# Patient Record
Sex: MALE | Race: WHITE | NOT HISPANIC OR LATINO | Employment: OTHER | URBAN - METROPOLITAN AREA
[De-identification: names, ages, dates, MRNs, and addresses within clinical notes are randomized per-mention and may not be internally consistent; named-entity substitution may affect disease eponyms.]

---

## 2015-12-14 LAB — CRC RECOMMENDATION EXT: NORMAL

## 2017-02-02 ENCOUNTER — HISTORICAL (OUTPATIENT)
Dept: LAB | Facility: HOSPITAL | Age: 47
End: 2017-02-02

## 2017-02-02 LAB
ABS NEUT (OLG): 1.23 X10(3)/MCL (ref 2.1–9.2)
ALBUMIN SERPL-MCNC: 4.3 GM/DL (ref 3.4–5)
ALBUMIN/GLOB SERPL: 1.5 {RATIO}
ALP SERPL-CCNC: 55 UNIT/L (ref 50–136)
ALT SERPL-CCNC: 31 UNIT/L (ref 12–78)
AST SERPL-CCNC: 27 UNIT/L (ref 15–37)
BILIRUB SERPL-MCNC: 0.4 MG/DL (ref 0.2–1)
BILIRUBIN DIRECT+TOT PNL SERPL-MCNC: 0.1 MG/DL (ref 0–0.2)
BILIRUBIN DIRECT+TOT PNL SERPL-MCNC: 0.3 MG/DL (ref 0–0.8)
BUN SERPL-MCNC: 17 MG/DL (ref 7–18)
CALCIUM SERPL-MCNC: 9.2 MG/DL (ref 8.5–10.1)
CHLORIDE SERPL-SCNC: 106 MMOL/L (ref 98–107)
CHOLEST SERPL-MCNC: 109 MG/DL (ref 0–200)
CHOLEST/HDLC SERPL: 1.9 {RATIO} (ref 0–5)
CO2 SERPL-SCNC: 32 MMOL/L (ref 21–32)
CREAT SERPL-MCNC: 1.02 MG/DL (ref 0.7–1.3)
CREAT UR-MCNC: 136 MG/DL
EOSINOPHIL NFR BLD MANUAL: 1 % (ref 0–8)
ERYTHROCYTE [DISTWIDTH] IN BLOOD BY AUTOMATED COUNT: 12.5 % (ref 11.5–17)
EST. AVERAGE GLUCOSE BLD GHB EST-MCNC: 114 MG/DL
GLOBULIN SER-MCNC: 2.8 GM/DL (ref 2.4–3.5)
GLUCOSE SERPL-MCNC: 97 MG/DL (ref 74–106)
HBA1C MFR BLD: 5.6 % (ref 4.2–6.3)
HCT VFR BLD AUTO: 38 % (ref 42–52)
HDLC SERPL-MCNC: 56 MG/DL (ref 35–60)
HGB BLD-MCNC: 12.4 GM/DL (ref 14–18)
LDLC SERPL CALC-MCNC: 37 MG/DL (ref 0–129)
LYMPHOCYTES NFR BLD MANUAL: 38 % (ref 13–40)
LYMPHOCYTES NFR BLD MANUAL: 5 %
MCH RBC QN AUTO: 30.7 PG (ref 27–31)
MCHC RBC AUTO-ENTMCNC: 32.6 GM/DL (ref 33–36)
MCV RBC AUTO: 94.1 FL (ref 80–94)
MICROALBUMIN UR-MCNC: 1.2 MG/DL
MICROALBUMIN/CREAT RATIO PNL UR: 8.8 MG/GM CR (ref 0–30)
MONOCYTES NFR BLD MANUAL: 12 % (ref 2–11)
NEUTROPHILS NFR BLD MANUAL: 44 % (ref 47–80)
PLATELET # BLD AUTO: 184 X10(3)/MCL (ref 130–400)
PLATELET # BLD EST: NORMAL 10*3/UL
PMV BLD AUTO: 10.2 FL (ref 7.4–10.4)
POTASSIUM SERPL-SCNC: 4.5 MMOL/L (ref 3.5–5.1)
PROT SERPL-MCNC: 7.1 GM/DL (ref 6.4–8.2)
RBC # BLD AUTO: 4.04 X10(6)/MCL (ref 4.7–6.1)
SODIUM SERPL-SCNC: 143 MMOL/L (ref 136–145)
TESTOST SERPL-MCNC: 256 NG/DL (ref 241–827)
TRIGL SERPL-MCNC: 81 MG/DL (ref 30–150)
VLDLC SERPL CALC-MCNC: 16 MG/DL
WBC # SPEC AUTO: 2.8 X10(3)/MCL (ref 4.5–11.5)

## 2017-02-14 ENCOUNTER — HISTORICAL (OUTPATIENT)
Dept: LAB | Facility: HOSPITAL | Age: 47
End: 2017-02-14

## 2017-08-02 ENCOUNTER — HISTORICAL (OUTPATIENT)
Dept: LAB | Facility: HOSPITAL | Age: 47
End: 2017-08-02

## 2017-08-02 LAB
ABS NEUT (OLG): 1.58 X10(3)/MCL (ref 2.1–9.2)
BASOPHILS # BLD AUTO: 0 X10(3)/MCL (ref 0–0.2)
BASOPHILS NFR BLD AUTO: 1 %
EOSINOPHIL # BLD AUTO: 0 X10(3)/MCL (ref 0–0.9)
EOSINOPHIL NFR BLD AUTO: 1 %
ERYTHROCYTE [DISTWIDTH] IN BLOOD BY AUTOMATED COUNT: 12.8 % (ref 11.5–17)
HCT VFR BLD AUTO: 41.4 % (ref 42–52)
HGB BLD-MCNC: 13.5 GM/DL (ref 14–18)
LYMPHOCYTES # BLD AUTO: 1.6 X10(3)/MCL (ref 0.6–4.6)
LYMPHOCYTES NFR BLD AUTO: 45 %
MCH RBC QN AUTO: 29.7 PG (ref 27–31)
MCHC RBC AUTO-ENTMCNC: 32.6 GM/DL (ref 33–36)
MCV RBC AUTO: 91 FL (ref 80–94)
MONOCYTES # BLD AUTO: 0.3 X10(3)/MCL (ref 0.1–1.3)
MONOCYTES NFR BLD AUTO: 9 %
NEUTROPHILS # BLD AUTO: 1.58 X10(3)/MCL (ref 2.1–9.2)
NEUTROPHILS NFR BLD AUTO: 44 %
PLATELET # BLD AUTO: 218 X10(3)/MCL (ref 130–400)
PMV BLD AUTO: 9.6 FL (ref 9.4–12.4)
RBC # BLD AUTO: 4.55 X10(6)/MCL (ref 4.7–6.1)
WBC # SPEC AUTO: 3.6 X10(3)/MCL (ref 4.5–11.5)

## 2017-10-02 ENCOUNTER — HISTORICAL (OUTPATIENT)
Dept: RADIOLOGY | Facility: HOSPITAL | Age: 47
End: 2017-10-02

## 2017-11-02 ENCOUNTER — HISTORICAL (OUTPATIENT)
Dept: LAB | Facility: HOSPITAL | Age: 47
End: 2017-11-02

## 2017-11-02 LAB
ABS NEUT (OLG): 1.27 X10(3)/MCL (ref 2.1–9.2)
ACANTHOCYTES (OLG): 0
ALBUMIN SERPL-MCNC: 4.2 GM/DL (ref 3.4–5)
ALBUMIN/GLOB SERPL: 1.4 RATIO (ref 1.1–2)
ALP SERPL-CCNC: 51 UNIT/L (ref 50–136)
ALT SERPL-CCNC: 38 UNIT/L (ref 12–78)
AST SERPL-CCNC: 24 UNIT/L (ref 15–37)
BASOPHILS NFR BLD MANUAL: 2 % (ref 0–2)
BILIRUB SERPL-MCNC: 0.3 MG/DL (ref 0.2–1)
BILIRUBIN DIRECT+TOT PNL SERPL-MCNC: 0.1 MG/DL (ref 0–0.5)
BILIRUBIN DIRECT+TOT PNL SERPL-MCNC: 0.2 MG/DL (ref 0–0.8)
BUN SERPL-MCNC: 14 MG/DL (ref 7–18)
BURR CELLS BLD QL SMEAR: 0
CALCIUM SERPL-MCNC: 9.2 MG/DL (ref 8.5–10.1)
CHLORIDE SERPL-SCNC: 103 MMOL/L (ref 98–107)
CHOLEST SERPL-MCNC: 109 MG/DL (ref 0–200)
CHOLEST/HDLC SERPL: 2.3 {RATIO} (ref 0–5)
CO2 SERPL-SCNC: 29 MMOL/L (ref 21–32)
CREAT SERPL-MCNC: 0.77 MG/DL (ref 0.7–1.3)
EOSINOPHIL NFR BLD MANUAL: 1 % (ref 0–8)
ERYTHROCYTE [DISTWIDTH] IN BLOOD BY AUTOMATED COUNT: 12.6 % (ref 11.5–17)
EST. AVERAGE GLUCOSE BLD GHB EST-MCNC: 120 MG/DL
GLOBULIN SER-MCNC: 2.9 GM/DL (ref 2.4–3.5)
GLUCOSE SERPL-MCNC: 99 MG/DL (ref 74–106)
HBA1C MFR BLD: 5.8 % (ref 4.2–6.3)
HCT VFR BLD AUTO: 37.8 % (ref 42–52)
HDLC SERPL-MCNC: 48 MG/DL (ref 35–60)
HGB BLD-MCNC: 12.4 GM/DL (ref 14–18)
LDLC SERPL CALC-MCNC: 25 MG/DL (ref 0–129)
LYMPHOCYTES NFR BLD MANUAL: 16 %
LYMPHOCYTES NFR BLD MANUAL: 36 % (ref 13–40)
MCH RBC QN AUTO: 30 PG (ref 27–31)
MCHC RBC AUTO-ENTMCNC: 32.8 GM/DL (ref 33–36)
MCV RBC AUTO: 91.3 FL (ref 80–94)
MONOCYTES NFR BLD MANUAL: 6 % (ref 2–11)
NEUTROPHILS NFR BLD MANUAL: 39 % (ref 47–80)
OVALOCYTES BLD QL SMEAR: 0
PLATELET # BLD AUTO: 209 X10(3)/MCL (ref 130–400)
PLATELET # BLD EST: NORMAL 10*3/UL
PMV BLD AUTO: 9.7 FL (ref 7.4–10.4)
POTASSIUM SERPL-SCNC: 3.8 MMOL/L (ref 3.5–5.1)
PROT SERPL-MCNC: 7.1 GM/DL (ref 6.4–8.2)
RBC # BLD AUTO: 4.14 X10(6)/MCL (ref 4.7–6.1)
SODIUM SERPL-SCNC: 139 MMOL/L (ref 136–145)
TRIGL SERPL-MCNC: 182 MG/DL (ref 30–150)
VLDLC SERPL CALC-MCNC: 36 MG/DL
WBC # SPEC AUTO: 3.3 X10(3)/MCL (ref 4.5–11.5)

## 2017-12-07 ENCOUNTER — HISTORICAL (OUTPATIENT)
Dept: ADMINISTRATIVE | Facility: HOSPITAL | Age: 47
End: 2017-12-07

## 2017-12-07 LAB
ABS NEUT (OLG): 1.54 X10(3)/MCL (ref 2.1–9.2)
ALBUMIN SERPL-MCNC: 4.1 GM/DL (ref 3.4–5)
ALBUMIN/GLOB SERPL: 1.4 {RATIO}
ALP SERPL-CCNC: 56 UNIT/L (ref 50–136)
ALT SERPL-CCNC: 38 UNIT/L (ref 12–78)
ANISOCYTOSIS BLD QL SMEAR: 1
AST SERPL-CCNC: 21 UNIT/L (ref 15–37)
BASOPHILS # BLD AUTO: 0 X10(3)/MCL (ref 0–0.2)
BASOPHILS NFR BLD AUTO: 0.3 %
BASOPHILS NFR BLD MANUAL: 1 % (ref 0–2)
BILIRUB SERPL-MCNC: 0.2 MG/DL (ref 0.2–1)
BILIRUBIN DIRECT+TOT PNL SERPL-MCNC: 0.1 MG/DL (ref 0–0.2)
BILIRUBIN DIRECT+TOT PNL SERPL-MCNC: 0.1 MG/DL (ref 0–0.8)
BUN SERPL-MCNC: 21 MG/DL (ref 7–18)
BURR CELLS BLD QL SMEAR: 1
CALCIUM SERPL-MCNC: 8.9 MG/DL (ref 8.5–10.1)
CHLORIDE SERPL-SCNC: 104 MMOL/L (ref 98–107)
CO2 SERPL-SCNC: 29 MMOL/L (ref 21–32)
CREAT SERPL-MCNC: 0.82 MG/DL (ref 0.7–1.3)
EOSINOPHIL # BLD AUTO: 0 X10(3)/MCL (ref 0–0.9)
EOSINOPHIL NFR BLD AUTO: 1.3 %
EOSINOPHIL NFR BLD MANUAL: 2 % (ref 0–8)
ERYTHROCYTE [DISTWIDTH] IN BLOOD BY AUTOMATED COUNT: 12.7 % (ref 11.5–17)
ERYTHROCYTE [SEDIMENTATION RATE] IN BLOOD: 8 MM/HR (ref 0–15)
FERRITIN SERPL-MCNC: 60.5 NG/ML (ref 8–388)
FOLATE SERPL-MCNC: 30.9 NG/ML (ref 3.1–17.5)
GLOBULIN SER-MCNC: 3 GM/DL (ref 2.4–3.5)
GLUCOSE SERPL-MCNC: 87 MG/DL (ref 74–106)
HAPTOGLOB SERPL-MCNC: 91 MG/DL (ref 31–200)
HAV IGM SERPL QL IA: NEGATIVE
HBV CORE IGM SERPL QL IA: NEGATIVE
HBV SURFACE AG SERPL QL IA: NEGATIVE
HCT VFR BLD AUTO: 37 % (ref 42–52)
HCV AB SERPL QL IA: NEGATIVE
HEPATITIS PANEL INTERP: NORMAL
HGB BLD-MCNC: 12.5 GM/DL (ref 14–18)
HIV 1+2 AB+HIV1 P24 AG SERPL QL IA: NEGATIVE
IRON SATN MFR SERPL: 26.6 % (ref 20–50)
IRON SERPL-MCNC: 106 MCG/DL (ref 50–175)
LDH SERPL-CCNC: 167 UNIT/L (ref 87–241)
LYMPHOCYTES # BLD AUTO: 1.8 X10(3)/MCL (ref 0.6–4.6)
LYMPHOCYTES NFR BLD AUTO: 47.5 %
LYMPHOCYTES NFR BLD MANUAL: 31 % (ref 13–40)
LYMPHOCYTES NFR BLD MANUAL: 9 %
MCH RBC QN AUTO: 30.9 PG (ref 27–31)
MCHC RBC AUTO-ENTMCNC: 33.8 GM/DL (ref 33–36)
MCV RBC AUTO: 91.6 FL (ref 80–94)
MICROCYTES BLD QL SMEAR: 1
MONOCYTES # BLD AUTO: 0.4 X10(3)/MCL (ref 0.1–1.3)
MONOCYTES NFR BLD AUTO: 9.9 %
MONOCYTES NFR BLD MANUAL: 11 % (ref 2–11)
NEUTROPHILS # BLD AUTO: 1.5 X10(3)/MCL (ref 2.1–9.2)
NEUTROPHILS # BLD AUTO: 1.51 X10(3)/MCL (ref 2.1–9.2)
NEUTROPHILS NFR BLD AUTO: 41 %
NEUTROPHILS NFR BLD MANUAL: 46 % (ref 47–80)
PLATELET # BLD AUTO: 201 X10(3)/MCL (ref 130–400)
PLATELET # BLD EST: NORMAL 10*3/UL
PMV BLD AUTO: 8.7 FL (ref 9.4–12.4)
POTASSIUM SERPL-SCNC: 3.5 MMOL/L (ref 3.5–5.1)
PROT SERPL-MCNC: 6.9 GM/DL
PROT SERPL-MCNC: 7.1 GM/DL (ref 6.4–8.2)
RBC # BLD AUTO: 4.04 X10(6)/MCL (ref 4.7–6.1)
RET# (OHS): 0.08 X10^6/ML (ref 0.03–0.1)
RETICULOCYTE COUNT AUTOMATED (OLG): 2 % (ref 1.1–2.1)
RHEUMATOID FACT SERPL-ACNC: 10 IU/ML (ref 0–15)
SODIUM SERPL-SCNC: 139 MMOL/L (ref 136–145)
TIBC SERPL-MCNC: 399 MCG/DL (ref 250–450)
TRANSFERRIN SERPL-MCNC: 307 MG/DL (ref 200–360)
TSH SERPL-ACNC: 1.21 MIU/ML (ref 0.36–3.74)
VIT B12 SERPL-MCNC: 1600 PG/ML (ref 193–986)
WBC # SPEC AUTO: 3.8 X10(3)/MCL (ref 4.5–11.5)

## 2017-12-15 ENCOUNTER — HISTORICAL (OUTPATIENT)
Dept: RADIOLOGY | Facility: HOSPITAL | Age: 47
End: 2017-12-15

## 2017-12-22 ENCOUNTER — HISTORICAL (OUTPATIENT)
Dept: ADMINISTRATIVE | Facility: HOSPITAL | Age: 47
End: 2017-12-22

## 2017-12-22 LAB
ABS NEUT (OLG): 1.45 X10(3)/MCL (ref 2.1–9.2)
BASOPHILS NFR BLD MANUAL: 2 % (ref 0–2)
EOSINOPHIL NFR BLD MANUAL: 5 % (ref 0–8)
ERYTHROCYTE [DISTWIDTH] IN BLOOD BY AUTOMATED COUNT: 12.9 % (ref 11.5–17)
HCT VFR BLD AUTO: 34.8 % (ref 42–52)
HGB BLD-MCNC: 11.8 GM/DL (ref 14–18)
LYMPHOCYTES NFR BLD MANUAL: 34 % (ref 13–40)
LYMPHOCYTES NFR BLD MANUAL: 6 %
MCH RBC QN AUTO: 30.6 PG (ref 27–31)
MCHC RBC AUTO-ENTMCNC: 33.9 GM/DL (ref 33–36)
MCV RBC AUTO: 90.2 FL (ref 80–94)
MONOCYTES NFR BLD MANUAL: 12 % (ref 2–11)
NEUTROPHILS NFR BLD MANUAL: 42 % (ref 47–80)
PLATELET # BLD AUTO: 204 X10(3)/MCL (ref 130–400)
PLATELET # BLD EST: NORMAL 10*3/UL
PMV BLD AUTO: 9 FL (ref 7.4–10.4)
RBC # BLD AUTO: 3.86 X10(6)/MCL (ref 4.7–6.1)
TARGETS BLD QL SMEAR: 1
WBC # SPEC AUTO: 3.2 X10(3)/MCL (ref 4.5–11.5)

## 2017-12-27 ENCOUNTER — HISTORICAL (OUTPATIENT)
Dept: RADIOLOGY | Facility: HOSPITAL | Age: 47
End: 2017-12-27

## 2018-01-09 ENCOUNTER — HISTORICAL (OUTPATIENT)
Dept: RADIOLOGY | Facility: HOSPITAL | Age: 48
End: 2018-01-09

## 2018-02-20 ENCOUNTER — HISTORICAL (OUTPATIENT)
Dept: RADIOLOGY | Facility: HOSPITAL | Age: 48
End: 2018-02-20

## 2018-02-28 ENCOUNTER — HISTORICAL (OUTPATIENT)
Dept: LAB | Facility: HOSPITAL | Age: 48
End: 2018-02-28

## 2018-02-28 LAB
CHOLEST SERPL-MCNC: 124 MG/DL (ref 0–200)
CHOLEST/HDLC SERPL: 2.3 {RATIO} (ref 0–5)
CREAT UR-MCNC: 95.8 MG/DL
EST. AVERAGE GLUCOSE BLD GHB EST-MCNC: 123 MG/DL
HBA1C MFR BLD: 5.9 % (ref 4.2–6.3)
HDLC SERPL-MCNC: 54 MG/DL (ref 35–60)
LDLC SERPL CALC-MCNC: 52 MG/DL (ref 0–129)
MICROALBUMIN UR-MCNC: 0.8 MG/DL
MICROALBUMIN/CREAT RATIO PNL UR: 7.9 MG/GM CR (ref 0–30)
TRIGL SERPL-MCNC: 91 MG/DL (ref 30–150)
VLDLC SERPL CALC-MCNC: 18 MG/DL

## 2018-03-06 ENCOUNTER — HISTORICAL (OUTPATIENT)
Dept: INTENSIVE CARE | Facility: HOSPITAL | Age: 48
End: 2018-03-06

## 2018-04-12 ENCOUNTER — HISTORICAL (OUTPATIENT)
Dept: ADMINISTRATIVE | Facility: HOSPITAL | Age: 48
End: 2018-04-12

## 2018-04-12 LAB
ABS NEUT (OLG): 1.52 X10(3)/MCL (ref 2.1–9.2)
APTT PPP: 30.9 SECOND(S) (ref 24.8–36.9)
BASOPHILS # BLD AUTO: 0 X10(3)/MCL (ref 0–0.2)
BASOPHILS NFR BLD AUTO: 1 %
BUN SERPL-MCNC: 18 MG/DL (ref 7–18)
CALCIUM SERPL-MCNC: 9.2 MG/DL (ref 8.5–10.1)
CHLORIDE SERPL-SCNC: 106 MMOL/L (ref 98–107)
CO2 SERPL-SCNC: 32 MMOL/L (ref 21–32)
CREAT SERPL-MCNC: 0.87 MG/DL (ref 0.7–1.3)
CREAT/UREA NIT SERPL: 20.7
EOSINOPHIL # BLD AUTO: 0.1 X10(3)/MCL (ref 0–0.9)
EOSINOPHIL NFR BLD AUTO: 2 %
ERYTHROCYTE [DISTWIDTH] IN BLOOD BY AUTOMATED COUNT: 12.8 % (ref 11.5–17)
GLUCOSE SERPL-MCNC: 94 MG/DL (ref 74–106)
HCT VFR BLD AUTO: 39.5 % (ref 42–52)
HGB BLD-MCNC: 12.8 GM/DL (ref 14–18)
INR PPP: 0.9 (ref 0–1.27)
LYMPHOCYTES # BLD AUTO: 1.8 X10(3)/MCL (ref 0.6–4.6)
LYMPHOCYTES NFR BLD AUTO: 46 %
MCH RBC QN AUTO: 31 PG (ref 27–31)
MCHC RBC AUTO-ENTMCNC: 32.4 GM/DL (ref 33–36)
MCV RBC AUTO: 95.6 FL (ref 80–94)
MONOCYTES # BLD AUTO: 0.4 X10(3)/MCL (ref 0.1–1.3)
MONOCYTES NFR BLD AUTO: 11 %
NEUTROPHILS # BLD AUTO: 1.52 X10(3)/MCL (ref 1.4–7.9)
NEUTROPHILS NFR BLD AUTO: 40 %
PLATELET # BLD AUTO: 186 X10(3)/MCL (ref 130–400)
PMV BLD AUTO: 9.2 FL (ref 9.4–12.4)
POTASSIUM SERPL-SCNC: 4.4 MMOL/L (ref 3.5–5.1)
PROTHROMBIN TIME: 12.4 SECOND(S) (ref 12.2–14.7)
RBC # BLD AUTO: 4.13 X10(6)/MCL (ref 4.7–6.1)
SODIUM SERPL-SCNC: 141 MMOL/L (ref 136–145)
WBC # SPEC AUTO: 3.8 X10(3)/MCL (ref 4.5–11.5)

## 2018-06-05 ENCOUNTER — HISTORICAL (OUTPATIENT)
Dept: RADIOLOGY | Facility: HOSPITAL | Age: 48
End: 2018-06-05

## 2018-06-06 LAB
ABS NEUT (OLG): 1.8 X10(3)/MCL (ref 2.1–9.2)
ALBUMIN SERPL-MCNC: 3.9 GM/DL (ref 3.4–5)
ALBUMIN/GLOB SERPL: 1.5 {RATIO}
ALP SERPL-CCNC: 44 UNIT/L (ref 50–136)
ALT SERPL-CCNC: 71 UNIT/L (ref 12–78)
APTT PPP: 33 SECOND(S) (ref 24.8–36.9)
AST SERPL-CCNC: 60 UNIT/L (ref 15–37)
BASOPHILS NFR BLD MANUAL: 2 % (ref 0–2)
BILIRUB SERPL-MCNC: 0.5 MG/DL (ref 0.2–1)
BILIRUBIN DIRECT+TOT PNL SERPL-MCNC: 0.1 MG/DL (ref 0–0.2)
BILIRUBIN DIRECT+TOT PNL SERPL-MCNC: 0.4 MG/DL (ref 0–0.8)
BUN SERPL-MCNC: 22 MG/DL (ref 7–18)
CALCIUM SERPL-MCNC: 8.8 MG/DL (ref 8.5–10.1)
CHLORIDE SERPL-SCNC: 108 MMOL/L (ref 98–107)
CO2 SERPL-SCNC: 27 MMOL/L (ref 21–32)
CREAT SERPL-MCNC: 0.96 MG/DL (ref 0.7–1.3)
EOSINOPHIL NFR BLD MANUAL: 1 % (ref 0–8)
ERYTHROCYTE [DISTWIDTH] IN BLOOD BY AUTOMATED COUNT: 12.9 % (ref 11.5–17)
GLOBULIN SER-MCNC: 2.6 GM/DL (ref 2.4–3.5)
GLUCOSE SERPL-MCNC: 99 MG/DL (ref 74–106)
HCT VFR BLD AUTO: 37.2 % (ref 42–52)
HGB BLD-MCNC: 12.4 GM/DL (ref 14–18)
INR PPP: 0.92 (ref 0–1.27)
LYMPHOCYTES NFR BLD MANUAL: 33 % (ref 13–40)
MCH RBC QN AUTO: 31.7 PG (ref 27–31)
MCHC RBC AUTO-ENTMCNC: 33.3 GM/DL (ref 33–36)
MCV RBC AUTO: 95.1 FL (ref 80–94)
MONOCYTES NFR BLD MANUAL: 5 % (ref 2–11)
NEUTROPHILS NFR BLD MANUAL: 59 % (ref 47–80)
PLATELET # BLD AUTO: 187 X10(3)/MCL (ref 130–400)
PLATELET # BLD EST: NORMAL 10*3/UL
PMV BLD AUTO: 9.3 FL (ref 7.4–10.4)
POTASSIUM SERPL-SCNC: 3.9 MMOL/L (ref 3.5–5.1)
PROT SERPL-MCNC: 6.5 GM/DL (ref 6.4–8.2)
PROTHROMBIN TIME: 12.6 SECOND(S) (ref 12.2–14.7)
RBC # BLD AUTO: 3.91 X10(6)/MCL (ref 4.7–6.1)
SODIUM SERPL-SCNC: 143 MMOL/L (ref 136–145)
WBC # SPEC AUTO: 3.4 X10(3)/MCL (ref 4.5–11.5)

## 2018-06-14 ENCOUNTER — HISTORICAL (OUTPATIENT)
Dept: ONCOLOGY | Facility: HOSPITAL | Age: 48
End: 2018-06-14

## 2018-07-27 ENCOUNTER — HISTORICAL (OUTPATIENT)
Dept: HEMATOLOGY/ONCOLOGY | Facility: CLINIC | Age: 48
End: 2018-07-27

## 2018-07-27 LAB
ABS NEUT (OLG): 1.72 X10(3)/MCL (ref 2.1–9.2)
BASOPHILS # BLD AUTO: 0 X10(3)/MCL (ref 0–0.2)
BASOPHILS NFR BLD AUTO: 0.3 %
EOSINOPHIL # BLD AUTO: 0 X10(3)/MCL (ref 0–0.9)
EOSINOPHIL NFR BLD AUTO: 1.1 %
ERYTHROCYTE [DISTWIDTH] IN BLOOD BY AUTOMATED COUNT: 12.7 % (ref 11.5–17)
ERYTHROCYTE [SEDIMENTATION RATE] IN BLOOD: 11 MM/HR (ref 0–15)
FERRITIN SERPL-MCNC: 60.3 NG/ML (ref 8–388)
FOLATE SERPL-MCNC: 98.9 NG/ML (ref 3.1–17.5)
HCT VFR BLD AUTO: 37.1 % (ref 42–52)
HGB BLD-MCNC: 12.2 GM/DL (ref 14–18)
IRON SATN MFR SERPL: 24.4 % (ref 20–50)
IRON SERPL-MCNC: 104 MCG/DL (ref 50–175)
LYMPHOCYTES # BLD AUTO: 1.5 X10(3)/MCL (ref 0.6–4.6)
LYMPHOCYTES NFR BLD AUTO: 41.9 %
MCH RBC QN AUTO: 30.8 PG (ref 27–31)
MCHC RBC AUTO-ENTMCNC: 32.9 GM/DL (ref 33–36)
MCV RBC AUTO: 93.7 FL (ref 80–94)
MONOCYTES # BLD AUTO: 0.3 X10(3)/MCL (ref 0.1–1.3)
MONOCYTES NFR BLD AUTO: 9.4 %
NEUTROPHILS # BLD AUTO: 1.7 X10(3)/MCL (ref 2.1–9.2)
NEUTROPHILS NFR BLD AUTO: 47.3 %
PLATELET # BLD AUTO: 219 X10(3)/MCL (ref 130–400)
PMV BLD AUTO: 8.6 FL (ref 9.4–12.4)
RBC # BLD AUTO: 3.96 X10(6)/MCL (ref 4.7–6.1)
RET# (OHS): 0.07 X10^6/ML (ref 0.03–0.1)
RETICULOCYTE COUNT AUTOMATED (OLG): 1.9 % (ref 1.1–2.1)
TIBC SERPL-MCNC: 426 MCG/DL (ref 250–450)
TRANSFERRIN SERPL-MCNC: 351 MG/DL (ref 200–360)
VIT B12 SERPL-MCNC: 1792 PG/ML (ref 193–986)
WBC # SPEC AUTO: 3.6 X10(3)/MCL (ref 4.5–11.5)

## 2018-11-16 ENCOUNTER — HISTORICAL (OUTPATIENT)
Dept: LAB | Facility: HOSPITAL | Age: 48
End: 2018-11-16

## 2018-11-16 LAB
ABS NEUT (OLG): 3.16 X10(3)/MCL (ref 2.1–9.2)
ALBUMIN SERPL-MCNC: 4.5 GM/DL (ref 3.4–5)
ALBUMIN/GLOB SERPL: 1.5 RATIO (ref 1.1–2)
ALP SERPL-CCNC: 53 UNIT/L (ref 50–136)
ALT SERPL-CCNC: 57 UNIT/L (ref 12–78)
APPEARANCE, UA: CLEAR
AST SERPL-CCNC: 47 UNIT/L (ref 15–37)
BACTERIA SPEC CULT: ABNORMAL /HPF
BASOPHILS # BLD AUTO: 0 X10(3)/MCL (ref 0–0.2)
BASOPHILS NFR BLD AUTO: 0 %
BILIRUB SERPL-MCNC: 0.4 MG/DL (ref 0.2–1)
BILIRUB UR QL STRIP: ABNORMAL
BILIRUBIN DIRECT+TOT PNL SERPL-MCNC: 0.2 MG/DL (ref 0–0.5)
BILIRUBIN DIRECT+TOT PNL SERPL-MCNC: 0.2 MG/DL (ref 0–0.8)
BUN SERPL-MCNC: 16 MG/DL (ref 7–18)
CALCIUM SERPL-MCNC: 9.7 MG/DL (ref 8.5–10.1)
CHLORIDE SERPL-SCNC: 103 MMOL/L (ref 98–107)
CHOLEST SERPL-MCNC: 120 MG/DL (ref 0–200)
CHOLEST/HDLC SERPL: 1.8 {RATIO} (ref 0–5)
CO2 SERPL-SCNC: 33 MMOL/L (ref 21–32)
COLOR UR: ABNORMAL
CREAT SERPL-MCNC: 0.89 MG/DL (ref 0.7–1.3)
EOSINOPHIL # BLD AUTO: 0.1 X10(3)/MCL (ref 0–0.9)
EOSINOPHIL NFR BLD AUTO: 2 %
ERYTHROCYTE [DISTWIDTH] IN BLOOD BY AUTOMATED COUNT: 12.8 % (ref 11.5–17)
EST. AVERAGE GLUCOSE BLD GHB EST-MCNC: 123 MG/DL
GLOBULIN SER-MCNC: 3 GM/DL (ref 2.4–3.5)
GLUCOSE (UA): NEGATIVE
GLUCOSE SERPL-MCNC: 102 MG/DL (ref 74–106)
HBA1C MFR BLD: 5.9 % (ref 4.2–6.3)
HCT VFR BLD AUTO: 41.6 % (ref 42–52)
HDLC SERPL-MCNC: 67 MG/DL (ref 35–60)
HGB BLD-MCNC: 13.3 GM/DL (ref 14–18)
HGB UR QL STRIP: NEGATIVE
KETONES UR QL STRIP: ABNORMAL
LDLC SERPL CALC-MCNC: 43 MG/DL (ref 0–129)
LEUKOCYTE ESTERASE UR QL STRIP: ABNORMAL
LYMPHOCYTES # BLD AUTO: 1 X10(3)/MCL (ref 0.6–4.6)
LYMPHOCYTES NFR BLD AUTO: 22 %
MCH RBC QN AUTO: 31 PG (ref 27–31)
MCHC RBC AUTO-ENTMCNC: 32 GM/DL (ref 33–36)
MCV RBC AUTO: 97 FL (ref 80–94)
MONOCYTES # BLD AUTO: 0.4 X10(3)/MCL (ref 0.1–1.3)
MONOCYTES NFR BLD AUTO: 9 %
NEUTROPHILS # BLD AUTO: 3.16 X10(3)/MCL (ref 2.1–9.2)
NEUTROPHILS NFR BLD AUTO: 66 %
NITRITE UR QL STRIP: NEGATIVE
PH UR STRIP: 6 [PH] (ref 5–9)
PLATELET # BLD AUTO: 207 X10(3)/MCL (ref 130–400)
PMV BLD AUTO: 9.8 FL (ref 9.4–12.4)
POTASSIUM SERPL-SCNC: 3.7 MMOL/L (ref 3.5–5.1)
PROT SERPL-MCNC: 7.5 GM/DL (ref 6.4–8.2)
PROT UR QL STRIP: NEGATIVE
RBC # BLD AUTO: 4.29 X10(6)/MCL (ref 4.7–6.1)
RBC #/AREA URNS HPF: ABNORMAL /[HPF]
SODIUM SERPL-SCNC: 143 MMOL/L (ref 136–145)
SP GR UR STRIP: 1.02 (ref 1–1.03)
SQUAMOUS EPITHELIAL, UA: 8 /HPF (ref 0–4)
TRIGL SERPL-MCNC: 49 MG/DL (ref 30–150)
TSH SERPL-ACNC: 0.8 MIU/L (ref 0.36–3.74)
UROBILINOGEN UR STRIP-ACNC: 1
VLDLC SERPL CALC-MCNC: 10 MG/DL
WBC # SPEC AUTO: 4.8 X10(3)/MCL (ref 4.5–11.5)
WBC #/AREA URNS HPF: 9 /HPF (ref 0–3)

## 2019-01-22 ENCOUNTER — HISTORICAL (OUTPATIENT)
Dept: HEMATOLOGY/ONCOLOGY | Facility: CLINIC | Age: 49
End: 2019-01-22

## 2019-01-22 LAB
ABS NEUT (OLG): 1.94 X10(3)/MCL (ref 2.1–9.2)
BASOPHILS # BLD AUTO: 0 X10(3)/MCL (ref 0–0.2)
BASOPHILS NFR BLD AUTO: 0.5 %
EOSINOPHIL # BLD AUTO: 0 X10(3)/MCL (ref 0–0.9)
EOSINOPHIL NFR BLD AUTO: 0.8 %
ERYTHROCYTE [DISTWIDTH] IN BLOOD BY AUTOMATED COUNT: 12.3 % (ref 11.5–17)
FERRITIN SERPL-MCNC: 102.3 NG/ML (ref 8–388)
FOLATE SERPL-MCNC: 35.6 NG/ML (ref 3.1–17.5)
HCT VFR BLD AUTO: 41.9 % (ref 42–52)
HGB BLD-MCNC: 13.6 GM/DL (ref 14–18)
IRON SATN MFR SERPL: 20.5 % (ref 20–50)
IRON SERPL-MCNC: 90 MCG/DL (ref 50–175)
LYMPHOCYTES # BLD AUTO: 1.5 X10(3)/MCL (ref 0.6–4.6)
LYMPHOCYTES NFR BLD AUTO: 40.1 %
MCH RBC QN AUTO: 29.9 PG (ref 27–31)
MCHC RBC AUTO-ENTMCNC: 32.5 GM/DL (ref 33–36)
MCV RBC AUTO: 92.1 FL (ref 80–94)
MONOCYTES # BLD AUTO: 0.2 X10(3)/MCL (ref 0.1–1.3)
MONOCYTES NFR BLD AUTO: 6.4 %
NEUTROPHILS # BLD AUTO: 1.9 X10(3)/MCL (ref 2.1–9.2)
NEUTROPHILS NFR BLD AUTO: 51.9 %
PLATELET # BLD AUTO: 241 X10(3)/MCL (ref 130–400)
PMV BLD AUTO: 8.7 FL (ref 9.4–12.4)
RBC # BLD AUTO: 4.55 X10(6)/MCL (ref 4.7–6.1)
TIBC SERPL-MCNC: 438 MCG/DL (ref 250–450)
TRANSFERRIN SERPL-MCNC: 318 MG/DL (ref 200–360)
VIT B12 SERPL-MCNC: 1731 PG/ML (ref 193–986)
WBC # SPEC AUTO: 3.7 X10(3)/MCL (ref 4.5–11.5)

## 2019-05-10 ENCOUNTER — HISTORICAL (OUTPATIENT)
Dept: LAB | Facility: HOSPITAL | Age: 49
End: 2019-05-10

## 2019-05-10 LAB
ABS NEUT (OLG): 1.57 X10(3)/MCL (ref 2.1–9.2)
ALBUMIN SERPL-MCNC: 4.1 GM/DL (ref 3.4–5)
ALBUMIN/GLOB SERPL: 1.4 {RATIO}
ALP SERPL-CCNC: 51 UNIT/L (ref 50–136)
ALT SERPL-CCNC: 83 UNIT/L (ref 12–78)
AST SERPL-CCNC: 59 UNIT/L (ref 15–37)
BASOPHILS # BLD AUTO: 0 X10(3)/MCL (ref 0–0.2)
BASOPHILS NFR BLD AUTO: 1 %
BILIRUB SERPL-MCNC: 0.3 MG/DL (ref 0.2–1)
BILIRUBIN DIRECT+TOT PNL SERPL-MCNC: 0.1 MG/DL (ref 0–0.2)
BILIRUBIN DIRECT+TOT PNL SERPL-MCNC: 0.2 MG/DL (ref 0–0.8)
BUN SERPL-MCNC: 18 MG/DL (ref 7–18)
CALCIUM SERPL-MCNC: 8.9 MG/DL (ref 8.5–10.1)
CHLORIDE SERPL-SCNC: 107 MMOL/L (ref 98–107)
CHOLEST SERPL-MCNC: 113 MG/DL (ref 0–200)
CHOLEST/HDLC SERPL: 1.8 {RATIO} (ref 0–5)
CO2 SERPL-SCNC: 31 MMOL/L (ref 21–32)
CREAT SERPL-MCNC: 0.78 MG/DL (ref 0.7–1.3)
CREAT UR-MCNC: 289 MG/DL
EOSINOPHIL # BLD AUTO: 0.1 X10(3)/MCL (ref 0–0.9)
EOSINOPHIL NFR BLD AUTO: 2 %
ERYTHROCYTE [DISTWIDTH] IN BLOOD BY AUTOMATED COUNT: 13.1 % (ref 11.5–17)
EST. AVERAGE GLUCOSE BLD GHB EST-MCNC: 128 MG/DL
GLOBULIN SER-MCNC: 2.9 GM/DL (ref 2.4–3.5)
GLUCOSE SERPL-MCNC: 101 MG/DL (ref 74–106)
HBA1C MFR BLD: 6.1 % (ref 4.2–6.3)
HCT VFR BLD AUTO: 42.6 % (ref 42–52)
HDLC SERPL-MCNC: 62 MG/DL (ref 35–60)
HGB BLD-MCNC: 13.5 GM/DL (ref 14–18)
LDLC SERPL CALC-MCNC: 39 MG/DL (ref 0–129)
LYMPHOCYTES # BLD AUTO: 1.4 X10(3)/MCL (ref 0.6–4.6)
LYMPHOCYTES NFR BLD AUTO: 40 %
MCH RBC QN AUTO: 30.2 PG (ref 27–31)
MCHC RBC AUTO-ENTMCNC: 31.7 GM/DL (ref 33–36)
MCV RBC AUTO: 95.3 FL (ref 80–94)
MICROALBUMIN UR-MCNC: 2.8 MG/DL
MICROALBUMIN/CREAT RATIO PNL UR: 9.7 MG/GM CR (ref 0–30)
MONOCYTES # BLD AUTO: 0.4 X10(3)/MCL (ref 0.1–1.3)
MONOCYTES NFR BLD AUTO: 11 %
NEUTROPHILS # BLD AUTO: 1.57 X10(3)/MCL (ref 2.1–9.2)
NEUTROPHILS NFR BLD AUTO: 46 %
PLATELET # BLD AUTO: 235 X10(3)/MCL (ref 130–400)
PMV BLD AUTO: 9.8 FL (ref 9.4–12.4)
POTASSIUM SERPL-SCNC: 4 MMOL/L (ref 3.5–5.1)
PROT SERPL-MCNC: 7 GM/DL (ref 6.4–8.2)
PSA SERPL-MCNC: 1.01 NG/ML (ref 0–4)
RBC # BLD AUTO: 4.47 X10(6)/MCL (ref 4.7–6.1)
SODIUM SERPL-SCNC: 141 MMOL/L (ref 136–145)
TRIGL SERPL-MCNC: 59 MG/DL (ref 30–150)
TSH SERPL-ACNC: 0.76 MIU/L (ref 0.36–3.74)
VLDLC SERPL CALC-MCNC: 12 MG/DL
WBC # SPEC AUTO: 3.4 X10(3)/MCL (ref 4.5–11.5)

## 2019-06-03 ENCOUNTER — HISTORICAL (OUTPATIENT)
Dept: ADMINISTRATIVE | Facility: HOSPITAL | Age: 49
End: 2019-06-03

## 2019-06-03 LAB
BUN SERPL-MCNC: 21 MG/DL (ref 7–18)
CALCIUM SERPL-MCNC: 9.5 MG/DL (ref 8.5–10.1)
CHLORIDE SERPL-SCNC: 103 MMOL/L (ref 98–107)
CO2 SERPL-SCNC: 29 MMOL/L (ref 21–32)
CREAT SERPL-MCNC: 0.89 MG/DL (ref 0.7–1.3)
CREAT/UREA NIT SERPL: 24
GLUCOSE SERPL-MCNC: 123 MG/DL (ref 74–106)
POTASSIUM SERPL-SCNC: 3.7 MMOL/L (ref 3.5–5.1)
SODIUM SERPL-SCNC: 137 MMOL/L (ref 136–145)

## 2019-06-05 ENCOUNTER — HISTORICAL (OUTPATIENT)
Dept: RADIOLOGY | Facility: HOSPITAL | Age: 49
End: 2019-06-05

## 2019-06-13 ENCOUNTER — HISTORICAL (OUTPATIENT)
Dept: SURGERY | Facility: HOSPITAL | Age: 49
End: 2019-06-13

## 2019-07-29 LAB
BILIRUB SERPL-MCNC: NEGATIVE MG/DL
BLOOD URINE, POC: NEGATIVE
CLARITY, POC UA: CLEAR
COLOR, POC UA: YELLOW
GLUCOSE UR QL STRIP: NEGATIVE
KETONES UR QL STRIP: NEGATIVE
LEUKOCYTE EST, POC UA: NEGATIVE
NITRITE, POC UA: NEGATIVE
PH, POC UA: 6
PROTEIN, POC: NEGATIVE
SPECIFIC GRAVITY, POC UA: 1.02

## 2019-09-12 ENCOUNTER — HISTORICAL (OUTPATIENT)
Dept: RADIOLOGY | Facility: HOSPITAL | Age: 49
End: 2019-09-12

## 2019-09-12 LAB — POC CREATININE: 0.8 MG/DL (ref 0.6–1.3)

## 2019-11-14 ENCOUNTER — HISTORICAL (OUTPATIENT)
Dept: LAB | Facility: HOSPITAL | Age: 49
End: 2019-11-14

## 2019-11-14 LAB
ALBUMIN SERPL-MCNC: 4.7 GM/DL (ref 3.4–5)
ALBUMIN/GLOB SERPL: 1.6 RATIO (ref 1.1–2)
ALP SERPL-CCNC: 62 UNIT/L (ref 50–136)
ALT SERPL-CCNC: 47 UNIT/L (ref 12–78)
AST SERPL-CCNC: 24 UNIT/L (ref 15–37)
BILIRUB SERPL-MCNC: 0.3 MG/DL (ref 0.2–1)
BILIRUBIN DIRECT+TOT PNL SERPL-MCNC: 0.1 MG/DL (ref 0–0.5)
BILIRUBIN DIRECT+TOT PNL SERPL-MCNC: 0.2 MG/DL (ref 0–0.8)
BUN SERPL-MCNC: 20 MG/DL (ref 7–18)
CALCIUM SERPL-MCNC: 9.9 MG/DL (ref 8.5–10.1)
CHLORIDE SERPL-SCNC: 101 MMOL/L (ref 98–107)
CO2 SERPL-SCNC: 33 MMOL/L (ref 21–32)
CREAT SERPL-MCNC: 1.02 MG/DL (ref 0.7–1.3)
EST. AVERAGE GLUCOSE BLD GHB EST-MCNC: 117 MG/DL
GLOBULIN SER-MCNC: 3 GM/DL (ref 2.4–3.5)
GLUCOSE SERPL-MCNC: 98 MG/DL (ref 74–106)
HBA1C MFR BLD: 5.7 % (ref 4.2–6.3)
POTASSIUM SERPL-SCNC: 3.5 MMOL/L (ref 3.5–5.1)
PROT SERPL-MCNC: 7.7 GM/DL (ref 6.4–8.2)
SODIUM SERPL-SCNC: 139 MMOL/L (ref 136–145)

## 2020-02-05 ENCOUNTER — HISTORICAL (OUTPATIENT)
Dept: HEMATOLOGY/ONCOLOGY | Facility: CLINIC | Age: 50
End: 2020-02-05

## 2020-02-05 LAB
ABS NEUT (OLG): 1.8 X10(3)/MCL (ref 2.1–9.2)
BASOPHILS # BLD AUTO: 0 X10(3)/MCL (ref 0–0.2)
BASOPHILS NFR BLD AUTO: 0.3 %
EOSINOPHIL # BLD AUTO: 0 X10(3)/MCL (ref 0–0.9)
EOSINOPHIL NFR BLD AUTO: 0.5 %
ERYTHROCYTE [DISTWIDTH] IN BLOOD BY AUTOMATED COUNT: 12.6 % (ref 11.5–17)
FERRITIN SERPL-MCNC: 82.8 NG/ML (ref 8–388)
FOLATE SERPL-MCNC: 25.8 NG/ML (ref 3.1–17.5)
HCT VFR BLD AUTO: 39.8 % (ref 42–52)
HGB BLD-MCNC: 13.2 GM/DL (ref 14–18)
IRON SATN MFR SERPL: 29.5 % (ref 20–50)
IRON SERPL-MCNC: 121 MCG/DL (ref 50–175)
LYMPHOCYTES # BLD AUTO: 1.8 X10(3)/MCL (ref 0.6–4.6)
LYMPHOCYTES NFR BLD AUTO: 44.7 %
MCH RBC QN AUTO: 30.6 PG (ref 27–31)
MCHC RBC AUTO-ENTMCNC: 33.2 GM/DL (ref 33–36)
MCV RBC AUTO: 92.1 FL (ref 80–94)
MONOCYTES # BLD AUTO: 0.3 X10(3)/MCL (ref 0.1–1.3)
MONOCYTES NFR BLD AUTO: 8.6 %
NEUTROPHILS # BLD AUTO: 1.8 X10(3)/MCL (ref 2.1–9.2)
NEUTROPHILS NFR BLD AUTO: 45.6 %
PLATELET # BLD AUTO: 186 X10(3)/MCL (ref 130–400)
PMV BLD AUTO: 8.5 FL (ref 9.4–12.4)
RBC # BLD AUTO: 4.32 X10(6)/MCL (ref 4.7–6.1)
RET# (OHS): 0.08 X10^6/ML (ref 0.03–0.1)
RETICULOCYTE COUNT AUTOMATED (OLG): 1.8 % (ref 1.1–2.1)
TIBC SERPL-MCNC: 410 MCG/DL (ref 250–450)
TRANSFERRIN SERPL-MCNC: 282 MG/DL (ref 200–360)
VIT B12 SERPL-MCNC: 1335 PG/ML (ref 193–986)
WBC # SPEC AUTO: 3.9 X10(3)/MCL (ref 4.5–11.5)

## 2020-02-13 ENCOUNTER — HISTORICAL (OUTPATIENT)
Dept: LAB | Facility: HOSPITAL | Age: 50
End: 2020-02-13

## 2020-02-13 LAB
ALBUMIN SERPL-MCNC: 4.6 GM/DL (ref 3.4–5)
ALBUMIN/GLOB SERPL: 1.5 {RATIO}
ALP SERPL-CCNC: 57 UNIT/L (ref 50–136)
ALT SERPL-CCNC: 46 UNIT/L (ref 12–78)
AST SERPL-CCNC: 25 UNIT/L (ref 15–37)
BILIRUB SERPL-MCNC: 0.3 MG/DL (ref 0.2–1)
BILIRUBIN DIRECT+TOT PNL SERPL-MCNC: 0.1 MG/DL (ref 0–0.2)
BILIRUBIN DIRECT+TOT PNL SERPL-MCNC: 0.2 MG/DL (ref 0–0.8)
BUN SERPL-MCNC: 18 MG/DL (ref 7–18)
CALCIUM SERPL-MCNC: 9.6 MG/DL (ref 8.5–10.1)
CHLORIDE SERPL-SCNC: 107 MMOL/L (ref 98–107)
CO2 SERPL-SCNC: 31 MMOL/L (ref 21–32)
CREAT SERPL-MCNC: 0.99 MG/DL (ref 0.7–1.3)
GLOBULIN SER-MCNC: 3 GM/DL (ref 2.4–3.5)
GLUCOSE SERPL-MCNC: 104 MG/DL (ref 74–106)
HAV IGM SERPL QL IA: NEGATIVE
HBV CORE IGM SERPL QL IA: NEGATIVE
HBV SURFACE AG SERPL QL IA: NEGATIVE
HCV AB SERPL QL IA: NEGATIVE
HEPATITIS PANEL INTERP: NORMAL
POTASSIUM SERPL-SCNC: 4.2 MMOL/L (ref 3.5–5.1)
PROT SERPL-MCNC: 7.6 GM/DL (ref 6.4–8.2)
SODIUM SERPL-SCNC: 141 MMOL/L (ref 136–145)

## 2020-05-15 ENCOUNTER — HISTORICAL (OUTPATIENT)
Dept: LAB | Facility: HOSPITAL | Age: 50
End: 2020-05-15

## 2020-05-15 LAB
APPEARANCE, UA: NORMAL
BILIRUB UR QL STRIP: NEGATIVE
CHOLEST SERPL-MCNC: 150 MG/DL
CHOLEST/HDLC SERPL: 3 {RATIO} (ref 0–5)
COLOR UR: YELLOW
CREAT UR-MCNC: 166.9 MG/DL (ref 58–161)
EST. AVERAGE GLUCOSE BLD GHB EST-MCNC: 116.9 MG/DL
GLUCOSE (UA): NEGATIVE
HBA1C MFR BLD: 5.7 %
HDLC SERPL-MCNC: 44 MG/DL (ref 40–60)
HGB UR QL STRIP: NEGATIVE
KETONES UR QL STRIP: NEGATIVE
LDLC SERPL CALC-MCNC: 55 MG/DL (ref 50–140)
LEUKOCYTE ESTERASE UR QL STRIP: NEGATIVE
MICROALBUMIN UR-MCNC: 16.6 UG/ML
MICROALBUMIN/CREAT RATIO PNL UR: 9.9 MG/GM CR (ref 0–30)
NITRITE UR QL STRIP: NEGATIVE
PH UR STRIP: 6.5 [PH] (ref 5–7)
PROT UR QL STRIP: NEGATIVE
SP GR UR STRIP: 1.02 (ref 1–1.03)
TRIGL SERPL-MCNC: 255 MG/DL (ref 0–150)
TSH SERPL-ACNC: 0.92 UIU/ML (ref 0.35–4.94)
UROBILINOGEN UR STRIP-ACNC: NEGATIVE
VLDLC SERPL CALC-MCNC: 51 MG/DL

## 2020-12-03 LAB
BUN SERPL-MCNC: 15.8 MG/DL (ref 8.4–25.7)
CALCIUM SERPL-MCNC: 9.6 MG/DL (ref 8.4–10.2)
CHLORIDE SERPL-SCNC: 104 MMOL/L (ref 98–107)
CO2 SERPL-SCNC: 28 MMOL/L (ref 22–29)
CREAT SERPL-MCNC: 0.8 MG/DL (ref 0.72–1.25)
CREAT/UREA NIT SERPL: 20
EST. AVERAGE GLUCOSE BLD GHB EST-MCNC: 119.8 MG/DL
GLUCOSE SERPL-MCNC: 146 MG/DL (ref 74–100)
HBA1C MFR BLD: 5.8 %
POTASSIUM SERPL-SCNC: 3.9 MMOL/L (ref 3.5–5.1)
SODIUM SERPL-SCNC: 140 MMOL/L (ref 136–145)

## 2020-12-14 LAB — SARS-COV-2 RNA RESP QL NAA+PROBE: NOT DETECTED

## 2020-12-17 ENCOUNTER — HISTORICAL (OUTPATIENT)
Dept: SURGERY | Facility: HOSPITAL | Age: 50
End: 2020-12-17

## 2020-12-17 LAB — EST CREAT CLEARANCE SER (OHS): 100.99 ML/MIN

## 2021-02-05 ENCOUNTER — HISTORICAL (OUTPATIENT)
Dept: HEMATOLOGY/ONCOLOGY | Facility: CLINIC | Age: 51
End: 2021-02-05

## 2021-02-05 LAB
ABS NEUT (OLG): 1.14 X10(3)/MCL (ref 2.1–9.2)
ALBUMIN SERPL-MCNC: 4.4 GM/DL (ref 3.5–5)
ALBUMIN/GLOB SERPL: 1.9 RATIO (ref 1.1–2)
ALP SERPL-CCNC: 51 UNIT/L (ref 40–150)
ALT SERPL-CCNC: 39 UNIT/L (ref 0–55)
AST SERPL-CCNC: 25 UNIT/L (ref 5–34)
BASOPHILS # BLD AUTO: 0 X10(3)/MCL (ref 0–0.2)
BASOPHILS NFR BLD AUTO: 0.4 %
BILIRUB SERPL-MCNC: 0.3 MG/DL
BILIRUBIN DIRECT+TOT PNL SERPL-MCNC: 0.1 MG/DL (ref 0–0.8)
BILIRUBIN DIRECT+TOT PNL SERPL-MCNC: 0.2 MG/DL (ref 0–0.5)
BUN SERPL-MCNC: 17.5 MG/DL (ref 8.4–25.7)
CALCIUM SERPL-MCNC: 9.3 MG/DL (ref 8.4–10.2)
CHLORIDE SERPL-SCNC: 107 MMOL/L (ref 98–107)
CO2 SERPL-SCNC: 26 MMOL/L (ref 22–29)
CREAT SERPL-MCNC: 0.86 MG/DL (ref 0.73–1.18)
EOSINOPHIL # BLD AUTO: 0 X10(3)/MCL (ref 0–0.9)
EOSINOPHIL NFR BLD AUTO: 0.7 %
ERYTHROCYTE [DISTWIDTH] IN BLOOD BY AUTOMATED COUNT: 12.5 % (ref 11.5–17)
FERRITIN SERPL-MCNC: 99.73 NG/ML (ref 21.81–274.66)
FOLATE SERPL-MCNC: 14.4 NG/ML (ref 7–31.4)
GLOBULIN SER-MCNC: 2.3 GM/DL (ref 2.4–3.5)
GLUCOSE SERPL-MCNC: 101 MG/DL (ref 74–100)
HCT VFR BLD AUTO: 38.2 % (ref 42–52)
HGB BLD-MCNC: 13 GM/DL (ref 14–18)
IRON SATN MFR SERPL: 32 % (ref 20–50)
IRON SERPL-MCNC: 113 UG/DL (ref 65–175)
LYMPHOCYTES # BLD AUTO: 1.4 X10(3)/MCL (ref 0.6–4.6)
LYMPHOCYTES NFR BLD AUTO: 50.4 %
LYMPHOCYTES NFR BLD MANUAL: 55 % (ref 13–40)
MCH RBC QN AUTO: 31 PG (ref 27–31)
MCHC RBC AUTO-ENTMCNC: 34 GM/DL (ref 33–36)
MCV RBC AUTO: 91 FL (ref 80–94)
MONOCYTES # BLD AUTO: 0.2 X10(3)/MCL (ref 0.1–1.3)
MONOCYTES NFR BLD AUTO: 7.5 %
MONOCYTES NFR BLD MANUAL: 6 % (ref 2–11)
NEUTROPHILS # BLD AUTO: 1.1 X10(3)/MCL (ref 2.1–9.2)
NEUTROPHILS NFR BLD AUTO: 40.6 %
NEUTROPHILS NFR BLD MANUAL: 39 % (ref 47–80)
PLATELET # BLD AUTO: 189 X10(3)/MCL (ref 130–400)
PLATELET # BLD EST: NORMAL 10*3/UL
PMV BLD AUTO: 8.7 FL (ref 9.4–12.4)
POTASSIUM SERPL-SCNC: 4.1 MMOL/L (ref 3.5–5.1)
PROT SERPL-MCNC: 6.7 GM/DL (ref 6.4–8.3)
RBC # BLD AUTO: 4.2 X10(6)/MCL (ref 4.7–6.1)
RBC MORPH BLD: NORMAL
SODIUM SERPL-SCNC: 144 MMOL/L (ref 136–145)
TIBC SERPL-MCNC: 239 UG/DL (ref 69–240)
TIBC SERPL-MCNC: 352 UG/DL (ref 250–450)
TRANSFERRIN SERPL-MCNC: 305 MG/DL (ref 174–364)
VIT B12 SERPL-MCNC: 1572 PG/ML (ref 213–816)
WBC # SPEC AUTO: 2.8 X10(3)/MCL (ref 4.5–11.5)

## 2021-05-12 ENCOUNTER — HISTORICAL (OUTPATIENT)
Dept: ADMINISTRATIVE | Facility: HOSPITAL | Age: 51
End: 2021-05-12

## 2021-05-12 LAB
ABS NEUT (OLG): 1.58 X10(3)/MCL (ref 2.1–9.2)
ALBUMIN SERPL-MCNC: 4.6 GM/DL (ref 3.5–5)
ALBUMIN/GLOB SERPL: 1.8 RATIO (ref 1.1–2)
ALP SERPL-CCNC: 52 UNIT/L (ref 40–150)
ALT SERPL-CCNC: 45 UNIT/L (ref 0–55)
APPEARANCE, UA: ABNORMAL
AST SERPL-CCNC: 27 UNIT/L (ref 5–34)
BACTERIA SPEC CULT: ABNORMAL /HPF
BASOPHILS # BLD AUTO: 0 X10(3)/MCL (ref 0–0.2)
BASOPHILS NFR BLD AUTO: 1 %
BILIRUB SERPL-MCNC: 0.3 MG/DL
BILIRUB UR QL STRIP: NEGATIVE
BILIRUBIN DIRECT+TOT PNL SERPL-MCNC: 0.1 MG/DL (ref 0–0.5)
BILIRUBIN DIRECT+TOT PNL SERPL-MCNC: 0.2 MG/DL (ref 0–0.8)
BUN SERPL-MCNC: 21.5 MG/DL (ref 8.4–25.7)
CALCIUM SERPL-MCNC: 9.8 MG/DL (ref 8.4–10.2)
CHLORIDE SERPL-SCNC: 103 MMOL/L (ref 98–107)
CHOLEST SERPL-MCNC: 136 MG/DL
CHOLEST/HDLC SERPL: 4 {RATIO} (ref 0–5)
CO2 SERPL-SCNC: 29 MMOL/L (ref 22–29)
COLOR UR: YELLOW
CREAT SERPL-MCNC: 0.88 MG/DL (ref 0.73–1.18)
CREAT UR-MCNC: 188.3 MG/DL (ref 58–161)
EOSINOPHIL # BLD AUTO: 0 X10(3)/MCL (ref 0–0.9)
EOSINOPHIL NFR BLD AUTO: 1 %
ERYTHROCYTE [DISTWIDTH] IN BLOOD BY AUTOMATED COUNT: 12.9 % (ref 11.5–17)
EST. AVERAGE GLUCOSE BLD GHB EST-MCNC: 125.5 MG/DL
GLOBULIN SER-MCNC: 2.5 GM/DL (ref 2.4–3.5)
GLUCOSE (UA): NEGATIVE
GLUCOSE SERPL-MCNC: 100 MG/DL (ref 74–100)
HBA1C MFR BLD: 6 %
HCT VFR BLD AUTO: 43.6 % (ref 42–52)
HDLC SERPL-MCNC: 36 MG/DL (ref 35–60)
HGB BLD-MCNC: 13.9 GM/DL (ref 14–18)
HGB UR QL STRIP: NEGATIVE
KETONES UR QL STRIP: NEGATIVE
LDLC SERPL CALC-MCNC: 40 MG/DL (ref 50–140)
LEUKOCYTE ESTERASE UR QL STRIP: ABNORMAL
LYMPHOCYTES # BLD AUTO: 1.5 X10(3)/MCL (ref 0.6–4.6)
LYMPHOCYTES NFR BLD AUTO: 44 %
MCH RBC QN AUTO: 30.3 PG (ref 27–31)
MCHC RBC AUTO-ENTMCNC: 31.9 GM/DL (ref 33–36)
MCV RBC AUTO: 95.2 FL (ref 80–94)
MICROALBUMIN UR-MCNC: 19.6 UG/ML
MICROALBUMIN/CREAT RATIO PNL UR: 10.4 MG/GM CR (ref 0–30)
MONOCYTES # BLD AUTO: 0.3 X10(3)/MCL (ref 0.1–1.3)
MONOCYTES NFR BLD AUTO: 8 %
NEUTROPHILS # BLD AUTO: 1.58 X10(3)/MCL (ref 2.1–9.2)
NEUTROPHILS NFR BLD AUTO: 46 %
NITRITE UR QL STRIP: NEGATIVE
PH UR STRIP: 7 [PH] (ref 5–9)
PLATELET # BLD AUTO: 238 X10(3)/MCL (ref 130–400)
PMV BLD AUTO: 10.2 FL (ref 9.4–12.4)
POTASSIUM SERPL-SCNC: 4.7 MMOL/L (ref 3.5–5.1)
PROT SERPL-MCNC: 7.1 GM/DL (ref 6.4–8.3)
PROT UR QL STRIP: NEGATIVE
PSA SERPL-MCNC: 0.86 NG/ML
RBC # BLD AUTO: 4.58 X10(6)/MCL (ref 4.7–6.1)
RBC #/AREA URNS HPF: ABNORMAL /[HPF]
SODIUM SERPL-SCNC: 140 MMOL/L (ref 136–145)
SP GR UR STRIP: 1.02 (ref 1–1.03)
SQUAMOUS EPITHELIAL, UA: ABNORMAL /HPF (ref 0–4)
TRIGL SERPL-MCNC: 299 MG/DL (ref 34–140)
TSH SERPL-ACNC: 1.24 UIU/ML (ref 0.35–4.94)
UROBILINOGEN UR STRIP-ACNC: 1
VLDLC SERPL CALC-MCNC: 60 MG/DL
WBC # SPEC AUTO: 3.4 X10(3)/MCL (ref 4.5–11.5)
WBC #/AREA URNS HPF: 118 /HPF (ref 0–3)

## 2021-11-12 ENCOUNTER — HISTORICAL (OUTPATIENT)
Dept: LAB | Facility: HOSPITAL | Age: 51
End: 2021-11-12

## 2021-11-12 LAB
ALBUMIN SERPL-MCNC: 4.4 GM/DL (ref 3.5–5)
ALBUMIN/GLOB SERPL: 1.5 RATIO (ref 1.1–2)
ALP SERPL-CCNC: 52 UNIT/L (ref 40–150)
ALT SERPL-CCNC: 39 UNIT/L (ref 0–55)
AST SERPL-CCNC: 26 UNIT/L (ref 5–34)
BILIRUB SERPL-MCNC: 0.3 MG/DL (ref 0.2–1.2)
BILIRUBIN DIRECT+TOT PNL SERPL-MCNC: 0.1 MG/DL (ref 0–0.5)
BILIRUBIN DIRECT+TOT PNL SERPL-MCNC: 0.2 MG/DL (ref 0–0.8)
BUN SERPL-MCNC: 14.5 MG/DL (ref 8.4–25.7)
CALCIUM SERPL-MCNC: 10 MG/DL (ref 8.4–10.2)
CHLORIDE SERPL-SCNC: 104 MMOL/L (ref 98–107)
CHOLEST SERPL-MCNC: 143 MG/DL
CHOLEST/HDLC SERPL: 4 {RATIO} (ref 0–5)
CO2 SERPL-SCNC: 31 MMOL/L (ref 22–29)
CREAT SERPL-MCNC: 0.95 MG/DL (ref 0.72–1.25)
EST. AVERAGE GLUCOSE BLD GHB EST-MCNC: 128.4 MG/DL
GLOBULIN SER-MCNC: 3 GM/DL (ref 2.4–3.5)
GLUCOSE SERPL-MCNC: 112 MG/DL (ref 74–100)
HBA1C MFR BLD: 6.1 %
HDLC SERPL-MCNC: 40 MG/DL (ref 40–60)
LDLC SERPL CALC-MCNC: 71 MG/DL (ref 50–140)
POTASSIUM SERPL-SCNC: 4.6 MMOL/L (ref 3.5–5.1)
PROT SERPL-MCNC: 7.4 GM/DL (ref 6.4–8.3)
SODIUM SERPL-SCNC: 141 MMOL/L (ref 136–145)
TRIGL SERPL-MCNC: 162 MG/DL (ref 0–150)
VLDLC SERPL CALC-MCNC: 32 MG/DL

## 2022-02-04 ENCOUNTER — HISTORICAL (OUTPATIENT)
Dept: HEMATOLOGY/ONCOLOGY | Facility: CLINIC | Age: 52
End: 2022-02-04

## 2022-02-04 LAB
ABS NEUT (OLG): 1.79 (ref 2.1–9.2)
ALBUMIN SERPL-MCNC: 4.3 G/DL (ref 3.5–5)
ALBUMIN/GLOB SERPL: 1.6 {RATIO} (ref 1.1–2)
ALP SERPL-CCNC: 55 U/L (ref 40–150)
ALT SERPL-CCNC: 36 U/L (ref 0–55)
AST SERPL-CCNC: 24 U/L (ref 5–34)
BASOPHILS # BLD AUTO: 0 10*3/UL (ref 0–0.2)
BASOPHILS NFR BLD AUTO: 0.3 %
BILIRUB SERPL-MCNC: 0.4 MG/DL
BILIRUBIN DIRECT+TOT PNL SERPL-MCNC: 0.2 (ref 0–0.5)
BILIRUBIN DIRECT+TOT PNL SERPL-MCNC: 0.2 (ref 0–0.8)
BUN SERPL-MCNC: 12.2 MG/DL (ref 8.4–25.7)
CALCIUM SERPL-MCNC: 9.9 MG/DL (ref 8.7–10.5)
CHLORIDE SERPL-SCNC: 105 MMOL/L (ref 98–107)
CO2 SERPL-SCNC: 28 MMOL/L (ref 22–29)
CREAT SERPL-MCNC: 0.91 MG/DL (ref 0.73–1.18)
EOSINOPHIL # BLD AUTO: 0.1 10*3/UL (ref 0–0.9)
EOSINOPHIL NFR BLD AUTO: 1.9 %
ERYTHROCYTE [DISTWIDTH] IN BLOOD BY AUTOMATED COUNT: 12.9 % (ref 11.5–17)
FERRITIN SERPL-MCNC: 159.71 NG/ML (ref 21.81–274.66)
FOLATE SERPL-MCNC: 24.3 NG/ML (ref 7–31.4)
GLOBULIN SER-MCNC: 2.7 G/DL (ref 2.4–3.5)
GLUCOSE SERPL-MCNC: 207 MG/DL (ref 74–100)
HCT VFR BLD AUTO: 42.5 % (ref 42–52)
HEMOLYSIS INTERF INDEX SERPL-ACNC: 3
HGB BLD-MCNC: 13.7 G/DL (ref 14–18)
ICTERIC INTERF INDEX SERPL-ACNC: 0
IRON SATN MFR SERPL: 29 % (ref 20–50)
IRON SERPL-MCNC: 101 UG/DL (ref 65–175)
LIPEMIC INTERF INDEX SERPL-ACNC: 3
LYMPHOCYTES # BLD AUTO: 1.5 10*3/UL (ref 0.6–4.6)
LYMPHOCYTES NFR BLD AUTO: 41.8 %
MANUAL DIFF? (OHS): NO
MCH RBC QN AUTO: 30 PG (ref 27–31)
MCHC RBC AUTO-ENTMCNC: 32.2 G/DL (ref 33–36)
MCV RBC AUTO: 93 FL (ref 80–94)
MONOCYTES # BLD AUTO: 0.3 10*3/UL (ref 0.1–1.3)
MONOCYTES NFR BLD AUTO: 7.1 %
NEUTROPHILS # BLD AUTO: 1.8 10*3/UL (ref 2.1–9.2)
NEUTROPHILS NFR BLD AUTO: 48.9 %
PLATELET # BLD AUTO: 199 10*3/UL (ref 130–400)
PMV BLD AUTO: 8.8 FL (ref 9.4–12.4)
POTASSIUM SERPL-SCNC: 3.9 MMOL/L (ref 3.5–5.1)
PROT SERPL-MCNC: 7 G/DL (ref 6.4–8.3)
RBC # BLD AUTO: 4.57 10*6/UL (ref 4.7–6.1)
SODIUM SERPL-SCNC: 141 MMOL/L (ref 136–145)
TIBC SERPL-MCNC: 248 UG/DL (ref 69–240)
TIBC SERPL-MCNC: 349 UG/DL (ref 250–450)
TRANSFERRIN SERPL-MCNC: 306 MG/DL (ref 174–364)
VIT B12 SERPL-MCNC: >2000 PG/ML (ref 213–816)
WBC # SPEC AUTO: 3.7 10*3/UL (ref 4.5–11.5)

## 2022-04-09 ENCOUNTER — HISTORICAL (OUTPATIENT)
Dept: ADMINISTRATIVE | Facility: HOSPITAL | Age: 52
End: 2022-04-09
Payer: MEDICARE

## 2022-04-29 VITALS
HEIGHT: 67 IN | SYSTOLIC BLOOD PRESSURE: 106 MMHG | WEIGHT: 173.75 LBS | BODY MASS INDEX: 28.72 KG/M2 | BODY MASS INDEX: 27.92 KG/M2 | DIASTOLIC BLOOD PRESSURE: 66 MMHG | HEIGHT: 66 IN | SYSTOLIC BLOOD PRESSURE: 130 MMHG | OXYGEN SATURATION: 99 % | DIASTOLIC BLOOD PRESSURE: 76 MMHG | WEIGHT: 183 LBS

## 2022-04-30 NOTE — OP NOTE
DATE OF SURGERY:    04/12/2018    SURGEON:  Maycol Gore MD    PREOPERATIVE DIAGNOSIS:  Cervical and lumbar radiculopathy (chronic pain syndrome).    POSTOPERATIVE DIAGNOSIS:  Cervical and lumbar radiculopathy (chronic pain syndrome).    PROCEDURE:  Fluoroscopically guided placement of two dorsal column stimulator leads under anesthesia for trial.    EQUIPMENT USED:  MeBeam stimulator kit.    DETAILED DESCRIPTION:  Following informed consent, the patient was prepped and draped in the usual sterile fashion.  I infiltrated the tissue overlying L3-4 with local anesthetic.  Under fluoroscopic guidance, I advanced a 22 gauge 3.5 inch BD spinal needle to the epidural margin just right of midline at L1-2.  I administered more local anesthetic and then exchanged for a 14 gauge Tuohy curved tip needle that I used to enter the epidural space at L1-2.  I then introduced stimulator lead and advanced to the top of C2.  I then infiltrated the tissue overlying L3-4 with local anesthetic.  Under fluoroscopic guidance, I advanced a 22 gauge 3.5 inch BD spinal needle to the epidural margin just left of midline at L1-2.  I administered more local anesthetic and exchanged that needle for a 14 gauge Tuohy curve tip needle and entered the epidural space at L1-2.  I introduced stimulator lead and advanced it to the top of T9.  Final fluoroscopic images demonstrated good lead placement.  I then carefully removed the needles and guidewires while keeping the leads in place.  The leads were carefully secured with sterile dressing and connected to the generator for programming.  The patient tolerated the procedure well without apparent complication.    IMPRESSION:  Successful fluoroscopically guided placement of two dorsal column stimulator leads for trial.        ______________________________  MD SAMSON Blackwell/YODIT  DD:  04/12/2018  Time:  10:07AM  DT:  04/12/2018  Time:  03:35PM  Job #:  [id number]

## 2022-04-30 NOTE — OP NOTE
DATE OF SURGERY:    01/09/2018    SURGEON:  Raymond Burnett MD    PREOPERATIVE DIAGNOSIS:  Neurogenic bladder.    POSTOPERATIVE DIAGNOSIS:  Neurogenic bladder.    PROCEDURE:  Urodynamic study with intrarectal and intravesical pressure monitoring, complex uroflowmetry, electromyography and no fluoroscopy.    PREOPERATIVE HISTORY AND INDICATION FOR PROCEDURE:  This patient is a 47-year-old white male who underwent an injury to his spine related to fall about 5 or 6 years ago.  He has had some urinary difficulty and is bothered by nocturnal enuresis and slow urinary voiding during the day.  He is currently on Hytrin 5 mg.  He was given a trial of Toviaz and this made his symptoms worse.  He comes in today for urodynamics to further clarify his bladder function.    PROCEDURE IN DETAIL:  After placement of the above monitoring devices, the patient was found to have voided 750 cc with a residual of about 23 cc.  He was then filled at 60 cc per minute.  First sensation was around 800 cc.  We filled him to about a liter and allowed him to urinate where he demonstrated according to the urodynamic equipment a robust Detrusor contraction up to 106 cm of water pressure and some abdominal straining was involved intermittently throughout his voiding.  Electromyography suggested some increased activity, this seemed to be artifactual.  I was expecting to see a flaccid bladder with Valsalva straining to void and was surprised by the Detrusor contraction.  Based on these findings, I am going to recommend that the patient undergo timed voiding during the day and we will switch him from Hytrin to  Rapaflo to see if this provides better decreased in outlet resistance.  His nocturnal enuresis is so erratic, it seems only to occur when he does not wear an undergarment so I am not sure to address this at this time.  He does have upper tract imaging showing no hydronephrosis.       In summary, I think the patient has a safe  voiding pattern through it is bothersome to him particularly related to the period of time that it takes him to empty and the occasional nocturnal enuresis.  I am going to     try Rapaflo to see if we can get him emptying a little more quickly and we will consider options for his enuresis depending on his response to Rapaflo.        ______________________________  MD CIARAN Barry/MARQUIS  DD:  01/09/2018  Time:  03:06PM  DT:  01/10/2018  Time:  02:36PM  Job #:  [id number]    cc: Monica Gonzales

## 2022-04-30 NOTE — OP NOTE
DATE OF SURGERY:    06/14/2018    SURGEON:  Anand Roger MD    PREOPERATIVE DIAGNOSES:    1. Chronic pain syndrome.   2. Lumbar radiculopathy.  3. Cervical radiculopathy.   4. Post laminectomy syndrome.    POSTOPERATIVE DIAGNOSES:    1. Chronic pain syndrome.   2. Lumbar radiculopathy.  3. Cervical radiculopathy.   4. Post laminectomy syndrome.    PROCEDURE:  Implantation of spinal cord stimulator pulse generator.    INDICATION FOR PROCEDURE:  This is a 47-year-old male who presented to Dr. Gore's clinic with complaint of neck pain, bilateral upper extremity radiculopathy, as well as back pain and bilateral lower extremity radiculopathy.  The patient had previously undergone spine surgery in 2008 by Dr. Sharma and Dr. Strickland.  Dr. Gore performed a spinal cord stimulator trial extending lead in cervical region and a lead in the thoracic region.  The patient had good results.  The patient was referred to my clinic for consideration of implantation of the battery as Dr. Gore planned on placing the leads through a percutaneous style approach.  I counseled the patient extensively on Dr. Gore's request for me to place a battery.  All risks and benefits were discussed.  The patient agreed to go forward.    PROCEDURE IN DETAIL:  After informed consent was obtained, the patient was brought to the Operating Room, placed under general anesthesia and intubated by the anesthesia team.  He was transferred to the operating table in prone position, appropriately padded and prepped and draped in the usual sterile fashion.     I began the procedure by making approximately 5 cm incision on the right side of the patient's back below his rib cage.  Dissection was carried down 1 cm deep.  A finger sweep technique was used to create approximately 4 X 4 cm pocket.  Once Dr. Gore placed the leads and anchored them into the fascia, I then tunneled the leads to this pocket, connected to the spinal cord  stimulator battery and impedances were appropriate times two.  Relaxing loop was placed in this pocket and the battery was sunk.  Copious irrigation washed out the incision.  I performed standard layered closure.  All counts were correct times two.    ESTIMATED BLOOD LOSS:  Minimal.    BLOOD REPLACEMENT:  None.    SPECIMENS:  None.    IMPLANTS:  [employer] Senza pulse generator battery.    INTRAOPERATIVE COMPLICATIONS:  No apparent intraoperative complications for my portion of the procedure.        ______________________________  MD JOSE F De La Cruz/YODIT  DD:  06/15/2018  Time:  12:09AM  DT:  06/15/2018  Time:  01:58PM  Job #:  [id number]

## 2022-04-30 NOTE — OP NOTE
Patient:   Lamine Preciado Jr            MRN: [id number]            FIN: [id number]               Age:   50 years     Sex:  Male     :  1970   Associated Diagnoses:   None   Author:   Jay Nieto Jr, MD      Preoperative diagnosis: Right carpal tunnel syndrome    Postoperative diagnosis: Right carpal tunnel syndrome    Attending surgeon: Jya Nieto MD    Procedure: Right carpal tunnel release     Anesthesia: Local plus monitored care    Estimated blood loss: Minimal    Tourniquet time: About 15 minutes    Complications: None    History of present illness: Lamine is a pleasant 50-year-old who has had persisted numbness tingling and shooting pain into the hand.  Subjective and objective signs of carpal tunnel syndrome were observed.  After failing conservative measures, I recommended open carpal tunnel release.  The risks, benefits, and alternatives to therapy were presented.  The patient elects to proceed    Procedure: Lamine was initially seen in the preoperative unit where a history and physical were reviewed without change.  The operative extremity was marked.  Consents were reviewed.  All questions were answered.  The patient was then taken to the operating room placed supine on the operative table where monitored care was undertaken.  I injected local anesthesia around the incision.  Perioperative antibiotics were administered.  The operative extremity was prepped and draped in sterile fashion.  An attending lead timeout confirmed the operative side; I then began the procedure.    I began my incision at the intersection of Esteban's line and the radial side of the ring finger.  I sharply incised through skin and subcutaneous tissue.  I split through the palmar fascia.  I then came down to the transverse carpal ligament.  I first incised this with a 15 blade.  I cleaned the underside of the ligament using a freer elevator.  I then completed the incision using scissors while visualizing the  nerve.  I incised it distally until I encountered yellow fat.  I incised proximally until I was in the forearm fascia.  I inspected the nerve and there was no lesions.  There was no abnormal contents of the carpal tunnel.  I irrigated out the incision.  I closed the incision with nylon sutures.  The patient was placed into a resting hand splint.  The patient was awoken from anesthesia and transferred to the postop unit good condition.

## 2022-04-30 NOTE — OP NOTE
DATE OF SURGERY:        SURGEON:  Maycol Gore MD    SURGEON:  Maycol Gore MD.    PREOPERATIVE DIAGNOSES:    1. Chronic pain syndrome.  2. Lumbar radiculopathy.  3. Cervical radiculopathy.  4. Post laminectomy syndrome.    PROCEDURE PERFORMED:  Percutaneous placement of spinal cord stimulator leads.    DETAILED DESCRIPTION:  Following informed consent, the patient was prepped and draped in the usual sterile fashion.  14-gauge Tuohy curved tip spinal needles were used to enter the epidural space at L1-2.  A lead was introduced and advanced to the top of C2 and then the 2nd lead was introduced and advanced to the top of T9.  Following confirmation of good lead placement, cuts were made over the needles and the leads were subsequently anchored in place using the anchoring attachment.  These were then tunneled to the flank and connected to the generator that was placed by Dr. Roger.  Final tightening of the leads  in the anchoring attachment was done.  The wound was irrigated and subsequently closed with 3-0 Vicryl subcutaneous sutures.  The dermal layer was then closed with 3-0 Vicryl sutures.  Sterile dressings were then applied.  The patient tolerated the procedure well without apparent complication.        ______________________________  Maycol Gore MD    DP/UR  DD:  07/10/2018  Time:  02:27PM  DT:  07/10/2018  Time:  03:09PM  Job #:  [id number]
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
## 2022-04-30 NOTE — OP NOTE
DATE OF SURGERY:    06/13/2019    SURGEON:  Jay Nieto Jr, MD    PREOPERATIVE DIAGNOSIS:  Left carpal tunnel syndrome.    POSTOPERATIVE DIAGNOSIS:  Left carpal tunnel syndrome.    PROCEDURE PERFORMED:  Left open carpal tunnel release.    ASSISTANT:  None.    ANESTHESIA:  Block.    COMPLICATIONS:  None.    HISTORY OF PRESENT ILLNESS:  Lamine is a very pleasant 48-year-old, who has a longstanding history of left hand pain, numbness, and tingling.  He has failed conservative measures including injections.  I recommended open release.  I have discussed with him the risks, benefits, alternatives to therapy and he elected to proceed.    PROCEDURE IN DETAIL:  Lamine was initially seen in preoperative unit where his history and physical was reviewed without change.  His left arm was marked.  His consents were reviewed.  All questions were answered.  He was taken to the operating room and placed supine on the operating table where regional anesthesia was induced.  His left upper extremity was prepped and draped in a sterile fashion.  Attending led timeout, confirming the operative side.  Preoperative antibiotics were administered.  I began the procedure.     I started with an incision from the extension of Esteban line proximally in line with the radial side of the ring finger.  I sharply incised through skin and spread through subcutaneous tissue down to the palmar fashion and onto the transverse carpal ligament.  I incised this completely from the yellow fat distally to the proximal forearm fascia.  I inspected the nerve and the tendons.  They were without lesions.  I irrigated the     wound and closed the incision in layers.  Sterile dressing was placed.  He was awoken from anesthesia and transferred to the postoperative unit in good condition.        ______________________________  MD DANNY Gonzalez Jr/SR  DD:  06/13/2019  Time:  11:52AM  DT:  06/13/2019  Time:  12:04PM  Job #:  [id number]
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
## 2022-05-12 ENCOUNTER — LAB VISIT (OUTPATIENT)
Dept: LAB | Facility: HOSPITAL | Age: 52
End: 2022-05-12
Attending: FAMILY MEDICINE
Payer: MEDICARE

## 2022-05-12 DIAGNOSIS — E78.5 DYSLIPIDEMIA: ICD-10-CM

## 2022-05-12 DIAGNOSIS — I10 HYPERTENSION, UNSPECIFIED TYPE: ICD-10-CM

## 2022-05-12 DIAGNOSIS — F32.A DEPRESSION, UNSPECIFIED DEPRESSION TYPE: ICD-10-CM

## 2022-05-12 DIAGNOSIS — N31.9 NEUROGENIC BLADDER: ICD-10-CM

## 2022-05-12 DIAGNOSIS — F41.9 ANXIETY: ICD-10-CM

## 2022-05-12 DIAGNOSIS — N40.0 BENIGN PROSTATIC HYPERPLASIA, UNSPECIFIED WHETHER LOWER URINARY TRACT SYMPTOMS PRESENT: ICD-10-CM

## 2022-05-12 DIAGNOSIS — Z00.00 WELLNESS EXAMINATION: Primary | ICD-10-CM

## 2022-05-12 DIAGNOSIS — E11.9 DIABETES MELLITUS, TYPE 2: ICD-10-CM

## 2022-05-12 DIAGNOSIS — E34.9 TESTOSTERONE DEFICIENCY: ICD-10-CM

## 2022-05-12 DIAGNOSIS — G89.29 CHRONIC PAIN: ICD-10-CM

## 2022-05-12 LAB
ALBUMIN SERPL-MCNC: 4 GM/DL (ref 3.5–5)
ALBUMIN/GLOB SERPL: 1.5 RATIO (ref 1.1–2)
ALP SERPL-CCNC: 45 UNIT/L (ref 40–150)
ALT SERPL-CCNC: 77 UNIT/L (ref 0–55)
APPEARANCE UR: CLEAR
AST SERPL-CCNC: 56 UNIT/L (ref 5–34)
BASOPHILS # BLD AUTO: 0.02 X10(3)/MCL (ref 0–0.2)
BASOPHILS NFR BLD AUTO: 0.8 %
BILIRUB UR QL STRIP.AUTO: NEGATIVE MG/DL
BILIRUBIN DIRECT+TOT PNL SERPL-MCNC: 0.4 MG/DL
BUN SERPL-MCNC: 21.7 MG/DL (ref 8.4–25.7)
CALCIUM SERPL-MCNC: 9.4 MG/DL (ref 8.4–10.2)
CHLORIDE SERPL-SCNC: 107 MMOL/L (ref 98–107)
CHOLEST SERPL-MCNC: 103 MG/DL
CHOLEST/HDLC SERPL: 3 {RATIO} (ref 0–5)
CO2 SERPL-SCNC: 28 MMOL/L (ref 22–29)
COLOR UR AUTO: YELLOW
CREAT SERPL-MCNC: 0.76 MG/DL (ref 0.73–1.18)
CREAT UR-MCNC: 119.5 MG/DL (ref 63–166)
EOSINOPHIL # BLD AUTO: 0.03 X10(3)/MCL (ref 0–0.9)
EOSINOPHIL NFR BLD AUTO: 1.1 %
ERYTHROCYTE [DISTWIDTH] IN BLOOD BY AUTOMATED COUNT: 13.2 % (ref 11.5–17)
EST. AVERAGE GLUCOSE BLD GHB EST-MCNC: 125.5 MG/DL
FREE/TOTAL PSA (OHS): 14.1 %
GLOBULIN SER-MCNC: 2.7 GM/DL (ref 2.4–3.5)
GLUCOSE SERPL-MCNC: 100 MG/DL (ref 74–100)
GLUCOSE UR QL STRIP.AUTO: NEGATIVE MG/DL
HBA1C MFR BLD: 6 %
HCT VFR BLD AUTO: 37.6 % (ref 42–52)
HDLC SERPL-MCNC: 40 MG/DL (ref 35–60)
HGB BLD-MCNC: 12.5 GM/DL (ref 14–18)
IMM GRANULOCYTES # BLD AUTO: 0 X10(3)/MCL (ref 0–0.02)
IMM GRANULOCYTES NFR BLD AUTO: 0 % (ref 0–0.43)
KETONES UR QL STRIP.AUTO: NEGATIVE MG/DL
LDLC SERPL CALC-MCNC: 54 MG/DL (ref 50–140)
LEUKOCYTE ESTERASE UR QL STRIP.AUTO: NEGATIVE UNIT/L
LYMPHOCYTES # BLD AUTO: 1.07 X10(3)/MCL (ref 0.6–4.6)
LYMPHOCYTES NFR BLD AUTO: 40.2 %
MCH RBC QN AUTO: 30.3 PG (ref 27–31)
MCHC RBC AUTO-ENTMCNC: 33.2 MG/DL (ref 33–36)
MCV RBC AUTO: 91.3 FL (ref 80–94)
MICROALBUMIN UR-MCNC: 10.6 UG/ML
MICROALBUMIN/CREAT RATIO PNL UR: 8.9 MG/GM CR (ref 0–30)
MONOCYTES # BLD AUTO: 0.23 X10(3)/MCL (ref 0.1–1.3)
MONOCYTES NFR BLD AUTO: 8.6 %
NEUTROPHILS # BLD AUTO: 1.3 X10(3)/MCL (ref 2.1–9.2)
NEUTROPHILS NFR BLD AUTO: 49.3 %
NITRITE UR QL STRIP.AUTO: NEGATIVE
NRBC BLD AUTO-RTO: 0 %
PH UR STRIP.AUTO: 7 [PH]
PLATELET # BLD AUTO: 200 X10(3)/MCL (ref 130–400)
PMV BLD AUTO: 9.4 FL (ref 9.4–12.4)
POTASSIUM SERPL-SCNC: 3.8 MMOL/L (ref 3.5–5.1)
PROT SERPL-MCNC: 6.7 GM/DL (ref 6.4–8.3)
PROT UR QL STRIP.AUTO: NEGATIVE MG/DL
PSA FREE MFR SERPL: 14 %
PSA FREE SERPL-MCNC: 0.14 NG/ML
PSA SERPL-MCNC: 0.99 NG/ML
RBC # BLD AUTO: 4.12 X10(6)/MCL (ref 4.7–6.1)
RBC UR QL AUTO: NEGATIVE UNIT/L
SODIUM SERPL-SCNC: 143 MMOL/L (ref 136–145)
SP GR UR STRIP.AUTO: 1.02
TRIGL SERPL-MCNC: 46 MG/DL (ref 34–140)
TSH SERPL-ACNC: 0.97 UIU/ML (ref 0.35–4.94)
UROBILINOGEN UR STRIP-ACNC: 1 MG/DL
VLDLC SERPL CALC-MCNC: 9 MG/DL
WBC # SPEC AUTO: 2.7 X10(3)/MCL (ref 4.5–11.5)

## 2022-05-12 PROCEDURE — 36415 COLL VENOUS BLD VENIPUNCTURE: CPT

## 2022-05-12 PROCEDURE — 84154 ASSAY OF PSA FREE: CPT

## 2022-05-12 PROCEDURE — 80053 COMPREHEN METABOLIC PANEL: CPT

## 2022-05-12 PROCEDURE — 82043 UR ALBUMIN QUANTITATIVE: CPT

## 2022-05-12 PROCEDURE — 84153 ASSAY OF PSA TOTAL: CPT

## 2022-05-12 PROCEDURE — 80061 LIPID PANEL: CPT

## 2022-05-12 PROCEDURE — 85025 COMPLETE CBC W/AUTO DIFF WBC: CPT

## 2022-05-12 PROCEDURE — 81003 URINALYSIS AUTO W/O SCOPE: CPT

## 2022-05-12 PROCEDURE — 83036 HEMOGLOBIN GLYCOSYLATED A1C: CPT

## 2022-05-12 PROCEDURE — 84443 ASSAY THYROID STIM HORMONE: CPT

## 2022-05-13 NOTE — PROGRESS NOTES
"These results have been reviewed by your healthcare team.  You are advised to continue your current medications.    Labs are within normal range except:    >AST and ALT are elevated.  Avoid tylenol, alcohol and herbal teas. Encourage fluids. Monitor    >a1c improved to 6.0. great job    >he is anemic and slightly more so than previous.  His wbc count is also lower than previous. Keep appointments with dr. swenson    Please keep in mind that results may often be outside of the "normal" range while still being acceptable for your diagnosis and your plan of care.  You will be contacted if your results require a change in your medical treatment. If you wish to discuss your results, you will be required to schedule an appointment.   "

## 2022-05-20 RX ORDER — TRAZODONE HYDROCHLORIDE 150 MG/1
TABLET ORAL NIGHTLY
COMMUNITY
Start: 2022-04-25

## 2022-05-20 RX ORDER — VENLAFAXINE HYDROCHLORIDE 150 MG/1
150 CAPSULE, EXTENDED RELEASE ORAL 2 TIMES DAILY
COMMUNITY
Start: 2021-12-13

## 2022-05-20 RX ORDER — TERAZOSIN 5 MG/1
5 CAPSULE ORAL NIGHTLY
COMMUNITY
Start: 2022-03-10 | End: 2022-05-24 | Stop reason: SDUPTHER

## 2022-05-20 RX ORDER — CLONAZEPAM 0.5 MG/1
0.5 TABLET ORAL 2 TIMES DAILY
COMMUNITY
Start: 2022-03-10

## 2022-05-20 RX ORDER — FENOFIBRATE 160 MG/1
TABLET ORAL
COMMUNITY
Start: 2021-05-14 | End: 2022-05-24 | Stop reason: SDUPTHER

## 2022-05-20 RX ORDER — METFORMIN HYDROCHLORIDE 500 MG/1
500 TABLET ORAL
COMMUNITY
Start: 2022-04-20 | End: 2022-08-01 | Stop reason: SDUPTHER

## 2022-05-20 RX ORDER — HYDROCHLOROTHIAZIDE 25 MG/1
TABLET ORAL
COMMUNITY
Start: 2022-04-25 | End: 2023-06-14

## 2022-05-20 RX ORDER — ATORVASTATIN CALCIUM 80 MG/1
80 TABLET, FILM COATED ORAL DAILY
COMMUNITY
Start: 2022-02-15 | End: 2022-05-24 | Stop reason: SDUPTHER

## 2022-05-20 RX ORDER — TRAMADOL HYDROCHLORIDE 50 MG/1
50 TABLET ORAL
COMMUNITY
Start: 2021-05-19 | End: 2022-06-13 | Stop reason: SDUPTHER

## 2022-05-23 ENCOUNTER — OFFICE VISIT (OUTPATIENT)
Dept: NEUROLOGY | Facility: CLINIC | Age: 52
End: 2022-05-23
Payer: MEDICARE

## 2022-05-23 VITALS
DIASTOLIC BLOOD PRESSURE: 78 MMHG | WEIGHT: 170 LBS | HEIGHT: 67 IN | BODY MASS INDEX: 26.68 KG/M2 | SYSTOLIC BLOOD PRESSURE: 136 MMHG

## 2022-05-23 DIAGNOSIS — M54.12 CERVICAL RADICULOPATHY: Primary | ICD-10-CM

## 2022-05-23 DIAGNOSIS — G89.4 CHRONIC PAIN SYNDROME: ICD-10-CM

## 2022-05-23 PROCEDURE — 99213 PR OFFICE/OUTPT VISIT, EST, LEVL III, 20-29 MIN: ICD-10-PCS | Mod: S$PBB,,, | Performed by: PSYCHIATRY & NEUROLOGY

## 2022-05-23 PROCEDURE — 99213 OFFICE O/P EST LOW 20 MIN: CPT | Mod: PBBFAC | Performed by: PSYCHIATRY & NEUROLOGY

## 2022-05-23 PROCEDURE — 99999 PR PBB SHADOW E&M-EST. PATIENT-LVL III: ICD-10-PCS | Mod: PBBFAC,,, | Performed by: PSYCHIATRY & NEUROLOGY

## 2022-05-23 PROCEDURE — 99999 PR PBB SHADOW E&M-EST. PATIENT-LVL III: CPT | Mod: PBBFAC,,, | Performed by: PSYCHIATRY & NEUROLOGY

## 2022-05-23 PROCEDURE — 99213 OFFICE O/P EST LOW 20 MIN: CPT | Mod: S$PBB,,, | Performed by: PSYCHIATRY & NEUROLOGY

## 2022-05-23 RX ORDER — SOLIFENACIN SUCCINATE 10 MG/1
10 TABLET, FILM COATED ORAL DAILY
COMMUNITY

## 2022-05-23 RX ORDER — ASPIRIN 81 MG/1
81 TABLET ORAL DAILY
COMMUNITY

## 2022-05-23 NOTE — PROGRESS NOTES
"NEUROLOGY OFFICE VISIT NOTE  Lamine Preciado  [id number]  05/23/2022      Lamine Preciado is a 51 y.o. male who presents to neurology clinic for Neck Pain (Continues with neck pain and stiffness, unable to turn his head or move it much without pulling feeling./Pain level today 6/10)     -Percutaneous placement of spinal cord stimulator leads. 7/2018  -has done well since procedure. Has intermitting pain 10/10 every once in a while when looks up too much  -for about a year feels neck stiffness on right neck when turning to right. Also has difficulty turning neck to left. Cannot turn head quickly. Has difficulty looking up (not due to pain bu something blocking it)  - no weakness, falls, stool/urinary incontinence, saddle anesthesia    Review of Systems:   NEURO: as in hpi    Current Medications:   Current Outpatient Medications   Medication Sig Dispense Refill    aspirin (ECOTRIN) 81 MG EC tablet Take 81 mg by mouth once daily.      atorvastatin (LIPITOR) 80 MG tablet Take 80 mg by mouth once daily.      clonazePAM (KLONOPIN) 0.5 MG tablet Take 0.5 mg by mouth 2 (two) times daily.      fenofibrate 160 MG Tab   See Instructions, TAKE ONE TABLET BY MOUTH EVERY DAY, # 90 tab(s), 3 Refill(s), Pharmacy: [facility] [id number] [id number], 170, cm, Height/Length Dosing, 05/19/21 8:58:00 CDT, 83.25, kg, Weight Dosing, 05/19/21 8:58:00 CDT      hydroCHLOROthiazide (HYDRODIURIL) 25 MG tablet       metFORMIN (GLUCOPHAGE) 500 MG tablet Take 500 mg by mouth daily with breakfast.      solifenacin (VESICARE) 10 MG tablet Take 10 mg by mouth once daily.      terazosin (HYTRIN) 5 MG capsule Take 5 mg by mouth every evening.      traMADoL (ULTRAM) 50 mg tablet Take 50 mg by mouth.      traZODone (DESYREL) 150 MG tablet       venlafaxine (EFFEXOR-XR) 150 MG Cp24 Take 150 mg by mouth 2 (two) times a day.       No current facility-administered medications for this visit.         Blood pressure 136/78, height 5' 7" (1.702 m), weight 77.1 kg " (170 lb).   Physical Exam:  GEN: NAD  PULM: normal rate of breathing, nonlabored  MSK: normal cervical spine flexion but very limited extension and bilateral flexion and rotation. Cervical spine and paraspinous muscles R>L mildly ttp. Full painless BL UE AROM  Neurologic: Alert, oriented x 3.  No dysarthria    Assessment:   1. Cervical radiculopathy    2. Chronic pain syndrome        Plan:  SCS programming

## 2022-05-24 RX ORDER — FENOFIBRATE 160 MG/1
160 TABLET ORAL DAILY
Qty: 30 TABLET | Refills: 2 | Status: SHIPPED | OUTPATIENT
Start: 2022-05-24 | End: 2022-09-07 | Stop reason: SDUPTHER

## 2022-05-24 RX ORDER — ATORVASTATIN CALCIUM 80 MG/1
80 TABLET, FILM COATED ORAL DAILY
Qty: 30 TABLET | Refills: 2 | Status: SHIPPED | OUTPATIENT
Start: 2022-05-24 | End: 2022-12-05 | Stop reason: SDUPTHER

## 2022-05-24 RX ORDER — TERAZOSIN 5 MG/1
5 CAPSULE ORAL NIGHTLY
Qty: 30 CAPSULE | Refills: 2 | Status: SHIPPED | OUTPATIENT
Start: 2022-05-24 | End: 2022-06-23

## 2022-06-13 ENCOUNTER — OFFICE VISIT (OUTPATIENT)
Dept: FAMILY MEDICINE | Facility: CLINIC | Age: 52
End: 2022-06-13
Payer: MEDICARE

## 2022-06-13 VITALS
OXYGEN SATURATION: 98 % | WEIGHT: 176.81 LBS | TEMPERATURE: 99 F | HEART RATE: 68 BPM | DIASTOLIC BLOOD PRESSURE: 74 MMHG | HEIGHT: 67 IN | SYSTOLIC BLOOD PRESSURE: 122 MMHG | RESPIRATION RATE: 18 BRPM | BODY MASS INDEX: 27.75 KG/M2

## 2022-06-13 DIAGNOSIS — G89.4 CHRONIC PAIN SYNDROME: ICD-10-CM

## 2022-06-13 DIAGNOSIS — F41.9 ANXIETY: ICD-10-CM

## 2022-06-13 DIAGNOSIS — N31.9 NEUROGENIC BLADDER: ICD-10-CM

## 2022-06-13 DIAGNOSIS — R74.01 ELEVATED AST (SGOT): ICD-10-CM

## 2022-06-13 DIAGNOSIS — F32.A DEPRESSIVE DISORDER: ICD-10-CM

## 2022-06-13 DIAGNOSIS — Z12.5 PROSTATE CANCER SCREENING: ICD-10-CM

## 2022-06-13 DIAGNOSIS — E78.5 DYSLIPIDEMIA: ICD-10-CM

## 2022-06-13 DIAGNOSIS — N40.0 BENIGN PROSTATIC HYPERPLASIA, UNSPECIFIED WHETHER LOWER URINARY TRACT SYMPTOMS PRESENT: ICD-10-CM

## 2022-06-13 DIAGNOSIS — E11.9 TYPE 2 DIABETES MELLITUS WITHOUT COMPLICATION, WITHOUT LONG-TERM CURRENT USE OF INSULIN: ICD-10-CM

## 2022-06-13 DIAGNOSIS — Z00.00 MEDICARE ANNUAL WELLNESS VISIT, SUBSEQUENT: Primary | ICD-10-CM

## 2022-06-13 DIAGNOSIS — M54.12 CERVICAL RADICULOPATHY: ICD-10-CM

## 2022-06-13 DIAGNOSIS — D72.819 LEUKOPENIA, UNSPECIFIED TYPE: ICD-10-CM

## 2022-06-13 DIAGNOSIS — Z71.89 ADVANCED CARE PLANNING/COUNSELING DISCUSSION: ICD-10-CM

## 2022-06-13 DIAGNOSIS — D64.9 NORMOCYTIC ANEMIA: ICD-10-CM

## 2022-06-13 DIAGNOSIS — Z98.890 HISTORY OF BACK SURGERY: ICD-10-CM

## 2022-06-13 PROBLEM — G56.00 CARPAL TUNNEL SYNDROME: Status: ACTIVE | Noted: 2022-06-13

## 2022-06-13 PROCEDURE — 99497 PR ADVNCD CARE PLAN 30 MIN: ICD-10-PCS | Mod: ,,, | Performed by: FAMILY MEDICINE

## 2022-06-13 PROCEDURE — 99213 PR OFFICE/OUTPT VISIT, EST, LEVL III, 20-29 MIN: ICD-10-PCS | Mod: ,,, | Performed by: FAMILY MEDICINE

## 2022-06-13 PROCEDURE — 99497 ADVNCD CARE PLAN 30 MIN: CPT | Mod: ,,, | Performed by: FAMILY MEDICINE

## 2022-06-13 PROCEDURE — 99213 OFFICE O/P EST LOW 20 MIN: CPT | Mod: ,,, | Performed by: FAMILY MEDICINE

## 2022-06-13 RX ORDER — ACETAMINOPHEN, DIPHENHYDRAMINE HCL, PHENYLEPHRINE HCL 325; 25; 5 MG/1; MG/1; MG/1
TABLET ORAL
COMMUNITY

## 2022-06-13 RX ORDER — TRAMADOL HYDROCHLORIDE 50 MG/1
50 TABLET ORAL EVERY 8 HOURS PRN
Qty: 20 TABLET | Refills: 0 | Status: SHIPPED | OUTPATIENT
Start: 2022-06-13 | End: 2022-09-19 | Stop reason: SDUPTHER

## 2022-06-13 NOTE — ASSESSMENT & PLAN NOTE
Recent level low.  Will repeat labs in 1 month. On oral iron and b12.  Keep appointments with [facility]- dr. swenson

## 2022-06-13 NOTE — ASSESSMENT & PLAN NOTE
Advanced care planning discussed and paperwork given.    I attest that I have had a face to face discussion with patient and or surrogate decision maker.   Included surrogate decision maker: NO  Advanced directive in chart: NO  LAPOST: NO    Total time spent: 11 minutes

## 2022-06-13 NOTE — PROGRESS NOTES
"TIME UP & GO (TUG)  Test begins with patient sitting back in standard arm chair.   When "Go" is said, the patient stands up and walks 10 feet at a normal pace before turning, walking back and sitting down.    Observe the patients postural stability, gait, stride length, and sway.  Check all that apply:  ? [x] Slow tentative pace  ? [] Loss of balance  ? [] Short strides  ? [] Little or no arm swing  ? [] Steadying self on walls  ? [] Shuffling  ? [] En bloc turning  ? [] Not using assistive device properly    Time in seconds:  4 Seconds  (Older adults who takes = or > 12 seconds to complete TUG is at risk for falling.      WHISPER TEST  Test begins with patient standing arms length away (2 feet), facing away from examiner.  Patient covers the ear that is NOT being tested with one finger over the tragus.  Whisper a number-letter-number combination.  If a patient gets 3 total letters and/or numbers correct after a second attempt, it is considered a pass.    Right Ear: passed    [x] 8-M-3   [] K-5-R   [] 2-K-7   [] S-4-G  Left Ear: passed       [x] 8-M-3   [] K-5-R   [] 2-K-7   [] S-4-G      VISION SCREENING  unable to measure      MINI-COGNITIVE  Three Word Registration   []Version 1 [x]Version 2 []Version 3 []Version 4 []Version 5 []Version 6   [facility] Captain Daughter   [place] [employer] Garden Heaven   Chair Table Baby Finger Picture Moutain     Word Recall 3 points  Clock Drawing 2      HOME SAFETY QUESTIONNAIRE  Are emergency numbers kept by the phone and regularly updated? Yes  Are all household members aware of the dangers of smoking, especially in bed? Yes  Are working smoke alarm(s) and fire extinguisher(s) available for use? Yes  Do all household members know how to use them? Yes  Are firearms stored unloaded and securely locked? Yes  Have throw rugs been removed or fastened down? Yes  Are non-slip mats in all bathtubs and showers?  Yes  Do all stairways have a railing or " banister?  Yes  Are sidewalks and all outdoor steps clear of tools, toys and other articles?  Yes  Are doorways, halls, and stairs free of clutter?  Yes  Are all electrical cords in working order, easily seen, and not run under rug/carpets or wrapped around nails? Yes

## 2022-06-13 NOTE — ASSESSMENT & PLAN NOTE
Keep appointments with neuro.  isabel reviewed.  Asking for tramadol refill. Last fill was 11/2021 for #20.  Will fill.  Reminded patient to use sparingly and only prn.  Cautioned may cause drowsiness and therefore do not take before work or driving. Patient is agreeable and verbalized understanding

## 2022-06-13 NOTE — ASSESSMENT & PLAN NOTE
Lab Results   Component Value Date    HGBA1C 6.0 05/12/2022     Urine micro 5/2022  Foot exam today  Eye exam with dr. Whaley 1/2022    On statin and metformin. Continue on current meds.

## 2022-06-13 NOTE — ASSESSMENT & PLAN NOTE
Previously done labs reviewed with patient at time of appointment today  Prostate Specific Antigen (ng/mL)   Date Value   05/12/2022 0.99     Prostate Specific Antigen Free (ng/mL)   Date Value   05/12/2022 0.14     PSA % Free (%)   Date Value   05/12/2022 14     utd on colonoscopy 12/2015 with dr. Diaz. Repeat due 12/2025.   Advanced care planning discussed and paperwork given

## 2022-06-13 NOTE — PROGRESS NOTES
Subjective:        Patient ID: Lamine Preciado is a 51 y.o. male.    Chief Complaint: Medicare AWV (Medicare wellness)      Patient presents to the clinic unaccompanied for his wellness visit.  He did lab work prior to his appointment and was reviewed with patient at time of visit today.    HIS AST AND ALT WERE ELEVATED.  HE STATES HE HAD BEEN DRINKING A LOT OF GREEN TEA.  HE ALSO TAKES TYLENOL AS NEEDED FOR HIS BACK PAIN.    He has a history of carpal tunnel syndrome, chronic pain and cervical and lumbar radiculopathy.  He follows with Dr. Gore.  He had a stimulator placed in 2019 which has helped his symptoms however he was told he may need to get this updated.  He is asking for refill of his tramadol.  His last prescription of this was for # 20 Pills in November of 2021. He had carpal tunnel surgery on his left with Dr. blanco 6/13/19 and right 12/17/20.      He also has a history of diabetes.  His last foot exam was today.  His eye exams are done with Dr. Whaley.  He normally does this in January.  Last urine micro was May of 2022. Last A1c was 6.0 May 2022. He is currently on metformin 500 mg in the evening.  He does not check his sugars.  He is following a diabetic diet.      He also has a history of hyperlipidemia and is on atorvastatin and fenofibrate.    He has a history of BPH and neurogenic bladder.  His urologist is Dr. Burnett.  He had a TURP December 2020. He is on VESIcare.  myrbetriq did not work well.    He has a history of depression and anxiety and follows with Dr. Gorman. He is on Effexor, trazodone, and Klonopin.  Doing well.  Sees them every 3-4 months.  He also was following with a therapist, Ms. Amy Lejeune.    He also has a history of normocytic anemia and leukopenia and is being followed by Hematology, Dr. Durán, annually.  He is on oral iron.  HE WAS SLIGHTLY MORE ANEMIC AND LEUKOPENIC VERSUS PREVIOUS.    He had a colonoscopy done with Dr. Diaz December 2015 which recommended a  "repeat in 10 years.      Review of Systems   Constitutional: Negative.    HENT: Negative.    Eyes: Negative.    Respiratory: Negative.    Cardiovascular: Negative.    Gastrointestinal: Negative.    Endocrine: Negative.    Genitourinary: Negative.    Musculoskeletal: Negative.    Skin: Negative.    Allergic/Immunologic: Negative.    Neurological: Negative.    Hematological: Negative.    Psychiatric/Behavioral: Negative.    All other systems reviewed and are negative.        Review of patient's allergies indicates:  No Known Allergies   Vitals:    06/13/22 1354   BP: 122/74   Pulse: 68   Resp: 18   Temp: 98.6 °F (37 °C)   SpO2: 98%   Weight: 80.2 kg (176 lb 12.8 oz)   Height: 5' 7" (1.702 m)      Social History     Socioeconomic History    Marital status:    Tobacco Use    Smoking status: Never Smoker    Smokeless tobacco: Never Used   Substance and Sexual Activity    Alcohol use: Not Currently    Drug use: Never    Sexual activity: Yes      Family History   Problem Relation Age of Onset    Heart disease Mother     Diabetes Mother     Hypertension Mother     Diabetes Father     Heart failure Father     Depression Sister           Objective:     Physical Exam  Vitals reviewed.   Constitutional:       Appearance: Normal appearance. He is normal weight.   HENT:      Head: Normocephalic.      Nose: Nose normal.      Mouth/Throat:      Mouth: Mucous membranes are moist.      Pharynx: Oropharynx is clear.   Eyes:      Extraocular Movements: Extraocular movements intact.   Cardiovascular:      Rate and Rhythm: Normal rate and regular rhythm.   Pulmonary:      Effort: Pulmonary effort is normal.      Breath sounds: Normal breath sounds.   Musculoskeletal:         General: Normal range of motion.   Skin:     General: Skin is warm and dry.   Neurological:      General: No focal deficit present.      Mental Status: He is alert and oriented to person, place, and time. Mental status is at baseline. "   Psychiatric:         Mood and Affect: Mood normal.       Current Outpatient Medications on File Prior to Visit   Medication Sig Dispense Refill    aspirin (ECOTRIN) 81 MG EC tablet Take 81 mg by mouth once daily.      atorvastatin (LIPITOR) 80 MG tablet Take 1 tablet (80 mg total) by mouth once daily. 30 tablet 2    clonazePAM (KLONOPIN) 0.5 MG tablet Take 0.5 mg by mouth 2 (two) times daily.      docusate sodium (COLACE ORAL) Take by mouth.      fenofibrate 160 MG Tab Take 1 tablet (160 mg total) by mouth once daily. 30 tablet 2    hydroCHLOROthiazide (HYDRODIURIL) 25 MG tablet       iron,carb/vit C/vit B12/folic (IRON 100 PLUS ORAL) Take by mouth.      mecobalamin (B12 ACTIVE ORAL) Take by mouth.      melatonin 10 mg Tab Take by mouth.      metFORMIN (GLUCOPHAGE) 500 MG tablet Take 500 mg by mouth daily with breakfast.      multivit-minerals/FA/lycopene (ONE-A-DAY MEN'S ORAL) Take by mouth.      solifenacin (VESICARE) 10 MG tablet Take 10 mg by mouth once daily.      terazosin (HYTRIN) 5 MG capsule Take 1 capsule (5 mg total) by mouth every evening. 30 capsule 2    traZODone (DESYREL) 150 MG tablet       venlafaxine (EFFEXOR-XR) 150 MG Cp24 Take 150 mg by mouth 2 (two) times a day.      [DISCONTINUED] traMADoL (ULTRAM) 50 mg tablet Take 50 mg by mouth.       No current facility-administered medications on file prior to visit.     Health Maintenance   Topic Date Due    TETANUS VACCINE  02/02/2027    Lipid Panel  05/12/2027    Hepatitis C Screening  Completed      Results for orders placed or performed in visit on 05/12/22   Microalbumin/Creatinine Ratio, Urine   Result Value Ref Range    Urine Microalbumin 10.6 <=30.0 ug/ml    Urine Creatinine 119.5 63.0 - 166.0 mg/dL    Microalbumin Creatinine Ratio 8.9 0.0 - 30.0 mg/gm Cr   Urinalysis   Result Value Ref Range    Color, UA Yellow Yellow, Colorless, Other, Clear    Appearance, UA Clear Clear    Specific Gravity, UA 1.020     pH, UA 7.0 5.0,  5.5, 6.0, 6.5, 7.0, 7.5, 8.0, 8.5    Protein, UA Negative Negative, 300  mg/dL    Glucose, UA Negative Negative, Normal mg/dL    Ketones, UA Negative Negative, +1, +2, +3, +4, +5, >=160 mg/dL    Blood, UA Negative Negative unit/L    Bilirubin, UA Negative Negative mg/dL    Urobilinogen, UA 1.0 0.2, 1.0, Normal mg/dL    Nitrites, UA Negative Negative    Leukocyte Esterase, UA Negative Negative, 75  unit/L   Comprehensive Metabolic Panel   Result Value Ref Range    Sodium Level 143 136 - 145 mmol/L    Potassium Level 3.8 3.5 - 5.1 mmol/L    Chloride 107 98 - 107 mmol/L    Carbon Dioxide 28 22 - 29 mmol/L    Glucose Level 100 74 - 100 mg/dL    Blood Urea Nitrogen 21.7 8.4 - 25.7 mg/dL    Creatinine 0.76 0.73 - 1.18 mg/dL    Calcium Level Total 9.4 8.4 - 10.2 mg/dL    Protein Total 6.7 6.4 - 8.3 gm/dL    Albumin Level 4.0 3.5 - 5.0 gm/dL    Globulin 2.7 2.4 - 3.5 gm/dL    Albumin/Globulin Ratio 1.5 1.1 - 2.0 ratio    Bilirubin Total 0.4 <=1.5 mg/dL    Alkaline Phosphatase 45 40 - 150 unit/L    Alanine Aminotransferase 77 (H) 0 - 55 unit/L    Aspartate Aminotransferase 56 (H) 5 - 34 unit/L    Estimated GFR-Non  >60 mls/min/1.73/m2   Hemoglobin A1C   Result Value Ref Range    Hemoglobin A1c 6.0 <=7.0 %    Estimated Average Glucose 125.5 mg/dL   Lipid Panel   Result Value Ref Range    Cholesterol Total 103 <=200 mg/dL    HDL Cholesterol 40 35 - 60 mg/dL    Triglyceride 46 34 - 140 mg/dL    Cholesterol/HDL Ratio 3 0 - 5    Very Low Density Lipoprotein 9     LDL Cholesterol 54.00 50.00 - 140.00 mg/dL   TSH   Result Value Ref Range    Thyroid Stimulating Hormone 0.9690 0.3500 - 4.9400 uIU/mL   PSA, Total and Free   Result Value Ref Range    Prostate Specific Antigen 0.99 <=4.00 ng/mL    Prostate Specific Antigen Free 0.14 ng/mL    Free/Total PSA 14.1 %    PSA % Free 14 %   CBC with Differential   Result Value Ref Range    WBC 2.7 (L) 4.5 - 11.5 x10(3)/mcL    RBC 4.12 (L) 4.70 - 6.10 x10(6)/mcL    Hgb 12.5 (L)  14.0 - 18.0 gm/dL    Hct 37.6 (L) 42.0 - 52.0 %    MCV 91.3 80.0 - 94.0 fL    MCH 30.3 27.0 - 31.0 pg    MCHC 33.2 33.0 - 36.0 mg/dL    RDW 13.2 11.5 - 17.0 %    Platelet 200 130 - 400 x10(3)/mcL    MPV 9.4 9.4 - 12.4 fL    Neut % 49.3 %    Lymph % 40.2 %    Mono % 8.6 %    Eos % 1.1 %    Basophil % 0.8 %    Lymph # 1.07 0.6 - 4.6 x10(3)/mcL    Neut # 1.3 (L) 2.1 - 9.2 x10(3)/mcL    Mono # 0.23 0.1 - 1.3 x10(3)/mcL    Eos # 0.03 0 - 0.9 x10(3)/mcL    Baso # 0.02 0 - 0.2 x10(3)/mcL    IG# 0.00 0 - 0.0155 x10(3)/mcL    IG% 0.0 0 - 0.43 %    NRBC% 0.0 %          Assessment & Plan:     Active Problem List with Overview Notes    Diagnosis Date Noted    Medicare annual wellness visit, subsequent 06/13/2022    Carpal tunnel syndrome 06/13/2022    Chronic pain syndrome 06/13/2022    History of back surgery 06/13/2022    Cervical radiculopathy 06/13/2022    Type 2 diabetes mellitus 06/13/2022    Dyslipidemia 06/13/2022    Benign prostatic hyperplasia 06/13/2022    Neurogenic bladder 06/13/2022    Anxiety 06/13/2022    Depressive disorder 06/13/2022    Normocytic anemia 06/13/2022    Leukopenia 06/13/2022    Advanced care planning/counseling discussion 06/13/2022    Elevated AST (SGOT) 06/13/2022       1. Medicare annual wellness visit, subsequent  Assessment & Plan:  Previously done labs reviewed with patient at time of appointment today  Prostate Specific Antigen (ng/mL)   Date Value   05/12/2022 0.99     Prostate Specific Antigen Free (ng/mL)   Date Value   05/12/2022 0.14     PSA % Free (%)   Date Value   05/12/2022 14     utd on colonoscopy 12/2015 with dr. Diaz. Repeat due 12/2025.   Advanced care planning discussed and paperwork given    Orders:  -     CBC Auto Differential  -     Comprehensive Metabolic Panel  -     Hemoglobin A1C  -     Lipid Panel  -     Urinalysis, Reflex to Urine Culture Urine, Clean Catch  -     TSH  -     PSA, Screening  -     Microalbumin/Creatinine Ratio, Urine    2. Advanced  care planning/counseling discussion  Assessment & Plan:  Advanced care planning discussed and paperwork given.    I attest that I have had a face to face discussion with patient and or surrogate decision maker.   Included surrogate decision maker: NO  Advanced directive in chart: NO  LAPOST: NO    Total time spent: 11 minutes      Orders:  -     CBC Auto Differential  -     Comprehensive Metabolic Panel  -     Hemoglobin A1C  -     Lipid Panel  -     Urinalysis, Reflex to Urine Culture Urine, Clean Catch  -     TSH  -     PSA, Screening  -     Microalbumin/Creatinine Ratio, Urine    3. Elevated AST (SGOT)  Assessment & Plan:  Will avoid green tea and hepatotoxic drugs.  Will repeat cmp in 1 month. Order in    Orders:  -     Comprehensive Metabolic Panel  -     CBC Auto Differential  -     CBC Auto Differential  -     Comprehensive Metabolic Panel  -     Hemoglobin A1C  -     Lipid Panel  -     Urinalysis, Reflex to Urine Culture Urine, Clean Catch  -     TSH  -     PSA, Screening  -     Microalbumin/Creatinine Ratio, Urine    4. Type 2 diabetes mellitus without complication, without long-term current use of insulin  Assessment & Plan:  Lab Results   Component Value Date    HGBA1C 6.0 05/12/2022     Urine micro 5/2022  Foot exam today  Eye exam with dr. Whaley 1/2022    On statin and metformin. Continue on current meds.     Orders:  -     Comprehensive Metabolic Panel  -     CBC Auto Differential  -     CBC Auto Differential  -     Comprehensive Metabolic Panel  -     Hemoglobin A1C  -     Lipid Panel  -     Urinalysis, Reflex to Urine Culture Urine, Clean Catch  -     TSH  -     PSA, Screening  -     Microalbumin/Creatinine Ratio, Urine    5. Normocytic anemia  Assessment & Plan:  Recent level low.  Will repeat labs in 1 month. On oral iron and b12.  Keep appointments with heme- dr. swenson    Orders:  -     Comprehensive Metabolic Panel  -     CBC Auto Differential  -     CBC Auto Differential  -     Comprehensive  Metabolic Panel  -     Hemoglobin A1C  -     Lipid Panel  -     Urinalysis, Reflex to Urine Culture Urine, Clean Catch  -     TSH  -     PSA, Screening  -     Microalbumin/Creatinine Ratio, Urine    6. Leukopenia, unspecified type  Assessment & Plan:  Keep appointments with adeline swenson    Orders:  -     Comprehensive Metabolic Panel  -     CBC Auto Differential  -     CBC Auto Differential  -     Comprehensive Metabolic Panel  -     Hemoglobin A1C  -     Lipid Panel  -     Urinalysis, Reflex to Urine Culture Urine, Clean Catch  -     TSH  -     PSA, Screening  -     Microalbumin/Creatinine Ratio, Urine    7. Chronic pain syndrome  Assessment & Plan:  Keep appointments with neuro.  isabel reviewed.  Asking for tramadol refill. Last fill was 11/2021 for #20.  Will fill.  Reminded patient to use sparingly and only prn.  Cautioned may cause drowsiness and therefore do not take before work or driving. Patient is agreeable and verbalized understanding    Orders:  -     traMADoL (ULTRAM) 50 mg tablet  -     CBC Auto Differential  -     Comprehensive Metabolic Panel  -     Hemoglobin A1C  -     Lipid Panel  -     Urinalysis, Reflex to Urine Culture Urine, Clean Catch  -     TSH  -     PSA, Screening  -     Microalbumin/Creatinine Ratio, Urine    8. History of back surgery  Assessment & Plan:  Keep appointments with neuro    Orders:  -     CBC Auto Differential  -     Comprehensive Metabolic Panel  -     Hemoglobin A1C  -     Lipid Panel  -     Urinalysis, Reflex to Urine Culture Urine, Clean Catch  -     TSH  -     PSA, Screening  -     Microalbumin/Creatinine Ratio, Urine    9. Neurogenic bladder  Assessment & Plan:  Stable on vesicare. Keep appointments with urology    Orders:  -     CBC Auto Differential  -     Comprehensive Metabolic Panel  -     Hemoglobin A1C  -     Lipid Panel  -     Urinalysis, Reflex to Urine Culture Urine, Clean Catch  -     TSH  -     PSA, Screening  -     Microalbumin/Creatinine Ratio,  Urine    10. Benign prostatic hyperplasia, unspecified whether lower urinary tract symptoms present  Assessment & Plan:  On terazosin. Keep appointments with urology    Orders:  -     CBC Auto Differential  -     Comprehensive Metabolic Panel  -     Hemoglobin A1C  -     Lipid Panel  -     Urinalysis, Reflex to Urine Culture Urine, Clean Catch  -     TSH  -     PSA, Screening  -     Microalbumin/Creatinine Ratio, Urine    11. Dyslipidemia  Assessment & Plan:  On statin and fenofibrate. Well controlled    Orders:  -     CBC Auto Differential  -     Comprehensive Metabolic Panel  -     Hemoglobin A1C  -     Lipid Panel  -     Urinalysis, Reflex to Urine Culture Urine, Clean Catch  -     TSH  -     PSA, Screening  -     Microalbumin/Creatinine Ratio, Urine    12. Depressive disorder  Assessment & Plan:  Stable on current meds. Keep appointments with psych- dr. Gorman    Orders:  -     CBC Auto Differential  -     Comprehensive Metabolic Panel  -     Hemoglobin A1C  -     Lipid Panel  -     Urinalysis, Reflex to Urine Culture Urine, Clean Catch  -     TSH  -     PSA, Screening  -     Microalbumin/Creatinine Ratio, Urine    13. Anxiety  Assessment & Plan:  See depression A&P    Orders:  -     CBC Auto Differential  -     Comprehensive Metabolic Panel  -     Hemoglobin A1C  -     Lipid Panel  -     Urinalysis, Reflex to Urine Culture Urine, Clean Catch  -     TSH  -     PSA, Screening  -     Microalbumin/Creatinine Ratio, Urine    14. Cervical radiculopathy  Assessment & Plan:  See chronic pain A&P    Orders:  -     CBC Auto Differential  -     Comprehensive Metabolic Panel  -     Hemoglobin A1C  -     Lipid Panel  -     Urinalysis, Reflex to Urine Culture Urine, Clean Catch  -     TSH  -     PSA, Screening  -     Microalbumin/Creatinine Ratio, Urine    15. Prostate cancer screening  -     CBC Auto Differential  -     Comprehensive Metabolic Panel  -     Hemoglobin A1C  -     Lipid Panel  -     Urinalysis, Reflex to  Urine Culture Urine, Clean Catch  -     TSH  -     PSA, Screening  -     Microalbumin/Creatinine Ratio, Urine       Follow up for wellness with labs.

## 2022-06-29 ENCOUNTER — PATIENT OUTREACH (OUTPATIENT)
Dept: ADMINISTRATIVE | Facility: HOSPITAL | Age: 52
End: 2022-06-29
Payer: MEDICARE

## 2022-06-29 ENCOUNTER — LAB VISIT (OUTPATIENT)
Dept: LAB | Facility: HOSPITAL | Age: 52
End: 2022-06-29
Attending: FAMILY MEDICINE
Payer: MEDICARE

## 2022-06-29 DIAGNOSIS — F32.A DEPRESSIVE DISORDER: ICD-10-CM

## 2022-06-29 DIAGNOSIS — E78.5 DYSLIPIDEMIA: ICD-10-CM

## 2022-06-29 DIAGNOSIS — Z98.890 HISTORY OF BACK SURGERY: ICD-10-CM

## 2022-06-29 DIAGNOSIS — G89.4 CHRONIC PAIN SYNDROME: ICD-10-CM

## 2022-06-29 DIAGNOSIS — D72.819 LEUKOPENIA, UNSPECIFIED TYPE: ICD-10-CM

## 2022-06-29 DIAGNOSIS — M54.12 CERVICAL RADICULOPATHY: ICD-10-CM

## 2022-06-29 DIAGNOSIS — F41.9 ANXIETY: ICD-10-CM

## 2022-06-29 DIAGNOSIS — D64.9 NORMOCYTIC ANEMIA: ICD-10-CM

## 2022-06-29 DIAGNOSIS — Z00.00 MEDICARE ANNUAL WELLNESS VISIT, SUBSEQUENT: ICD-10-CM

## 2022-06-29 DIAGNOSIS — N40.0 BENIGN PROSTATIC HYPERPLASIA, UNSPECIFIED WHETHER LOWER URINARY TRACT SYMPTOMS PRESENT: ICD-10-CM

## 2022-06-29 DIAGNOSIS — E11.9 TYPE 2 DIABETES MELLITUS WITHOUT COMPLICATION, WITHOUT LONG-TERM CURRENT USE OF INSULIN: ICD-10-CM

## 2022-06-29 DIAGNOSIS — N31.9 NEUROGENIC BLADDER: ICD-10-CM

## 2022-06-29 DIAGNOSIS — Z71.89 ADVANCED CARE PLANNING/COUNSELING DISCUSSION: ICD-10-CM

## 2022-06-29 DIAGNOSIS — Z12.5 PROSTATE CANCER SCREENING: ICD-10-CM

## 2022-06-29 DIAGNOSIS — R74.01 ELEVATED AST (SGOT): ICD-10-CM

## 2022-06-29 LAB
APPEARANCE UR: CLEAR
BACTERIA #/AREA URNS AUTO: ABNORMAL /HPF
BILIRUB UR QL STRIP.AUTO: NEGATIVE MG/DL
COLOR UR AUTO: YELLOW
CREAT UR-MCNC: 142.4 MG/DL (ref 63–166)
GLUCOSE UR QL STRIP.AUTO: NEGATIVE MG/DL
KETONES UR QL STRIP.AUTO: NEGATIVE MG/DL
LEUKOCYTE ESTERASE UR QL STRIP.AUTO: ABNORMAL UNIT/L
MICROALBUMIN UR-MCNC: 19.7 UG/ML
MICROALBUMIN/CREAT RATIO PNL UR: 13.8 MG/GM CR (ref 0–30)
MUCOUS THREADS URNS QL MICRO: ABNORMAL /LPF
NITRITE UR QL STRIP.AUTO: NEGATIVE
PH UR STRIP.AUTO: 6 [PH]
PROT UR QL STRIP.AUTO: NEGATIVE MG/DL
RBC #/AREA URNS AUTO: ABNORMAL /HPF
RBC UR QL AUTO: NEGATIVE UNIT/L
SP GR UR STRIP.AUTO: 1.02
SQUAMOUS #/AREA URNS AUTO: ABNORMAL /HPF
UROBILINOGEN UR STRIP-ACNC: 0.2 MG/DL
WBC #/AREA URNS AUTO: ABNORMAL /HPF

## 2022-06-29 PROCEDURE — 81001 URINALYSIS AUTO W/SCOPE: CPT

## 2022-06-29 PROCEDURE — 82043 UR ALBUMIN QUANTITATIVE: CPT

## 2022-06-29 NOTE — PROGRESS NOTES
Population Health. Out Reach. The following record(s)  below were uploaded for Health Maintenance .    12/14/15 COLONOSCOPY

## 2022-06-29 NOTE — PROGRESS NOTES
"These results have been reviewed by your healthcare team.  You are advised to continue your current medications.    Labs are within normal range except  >some WBC and trace mucus and leukocyte esterase in ua.  Is he having any uti symptoms? If yes, will need to collect another urine to send for culture.  If not, encourage fluids and monitor      Please keep in mind that results may often be outside of the "normal" range while still being acceptable for your diagnosis and your plan of care.  You will be contacted if your results require a change in your medical treatment. If you wish to discuss your results, you will be required to schedule an appointment.   "

## 2022-07-01 ENCOUNTER — TELEPHONE (OUTPATIENT)
Dept: FAMILY MEDICINE | Facility: CLINIC | Age: 52
End: 2022-07-01
Payer: MEDICARE

## 2022-08-01 ENCOUNTER — TELEPHONE (OUTPATIENT)
Dept: FAMILY MEDICINE | Facility: CLINIC | Age: 52
End: 2022-08-01
Payer: MEDICARE

## 2022-08-01 DIAGNOSIS — E11.9 TYPE 2 DIABETES MELLITUS WITHOUT COMPLICATION, WITHOUT LONG-TERM CURRENT USE OF INSULIN: Primary | ICD-10-CM

## 2022-08-01 RX ORDER — METFORMIN HYDROCHLORIDE 500 MG/1
500 TABLET ORAL
Qty: 90 TABLET | Refills: 3 | Status: SHIPPED | OUTPATIENT
Start: 2022-08-01 | End: 2022-10-30

## 2022-08-01 NOTE — TELEPHONE ENCOUNTER
----- Message from Nancy Donaheu sent at 8/1/2022  1:04 PM CDT -----  Regarding: med refill  .Type:  RX Refill Request    Who Called: Pt  Refill or New Rx:Refill  RX Name and Strength:metFORMIN (GLUCOPHAGE) 500 MG tablet  How is the patient currently taking it? (ex. 1XDay):  Is this a 30 day or 90 day RX:90  Preferred Pharmacy with phone number:[id number] - [place], [place] -  [address] [facility].  Local or Mail Order:Local  Ordering Provider:Jaci  Would the patient rather a call back or a response via [facility]? Call back  Best Call Back Number:[phone]  Additional Information: Would like a 90 rather than 30 day supply.

## 2022-08-01 NOTE — TELEPHONE ENCOUNTER
LOV: 6.13.22  NOV:6.14.23  Rx sent to pharmacy. Patient notified and voiced understanding. Sherine

## 2022-09-07 DIAGNOSIS — E78.5 DYSLIPIDEMIA: Primary | ICD-10-CM

## 2022-09-07 RX ORDER — FENOFIBRATE 160 MG/1
160 TABLET ORAL DAILY
Qty: 30 TABLET | Refills: 3 | Status: SHIPPED | OUTPATIENT
Start: 2022-09-07 | End: 2023-01-31 | Stop reason: SDUPTHER

## 2022-09-12 PROBLEM — Z00.00 MEDICARE ANNUAL WELLNESS VISIT, SUBSEQUENT: Status: RESOLVED | Noted: 2022-06-13 | Resolved: 2022-09-12

## 2022-09-16 ENCOUNTER — HISTORICAL (OUTPATIENT)
Dept: ADMINISTRATIVE | Facility: HOSPITAL | Age: 52
End: 2022-09-16
Payer: MEDICARE

## 2022-09-19 DIAGNOSIS — G89.4 CHRONIC PAIN SYNDROME: ICD-10-CM

## 2022-09-19 RX ORDER — TRAMADOL HYDROCHLORIDE 50 MG/1
50 TABLET ORAL EVERY 8 HOURS PRN
Qty: 20 TABLET | Refills: 0 | Status: SHIPPED | OUTPATIENT
Start: 2022-09-19 | End: 2023-06-14 | Stop reason: SDUPTHER

## 2022-09-19 NOTE — TELEPHONE ENCOUNTER
----- Message from Nancy Donahue sent at 9/19/2022  1:35 PM CDT -----  Regarding: med refill  .Type:  RX Refill Request    Who Called: Venessa, cher tech  Refill or New Rx:Refill   RX Name and Strength:traMADoL (ULTRAM) 50 mg tablet  How is the patient currently taking it? (ex. 1XDay):1xday  Is this a 30 day or 90 day RX:20  Preferred Pharmacy with phone number:[id number] - [place], [place] - [address] [facility]  Local or Mail Order:Local  Ordering Provider:Jaci  Would the patient rather a call back or a response via [facility]? Call back  Best Call Back Number:[phone]  Additional Information:

## 2022-12-05 RX ORDER — ATORVASTATIN CALCIUM 80 MG/1
80 TABLET, FILM COATED ORAL DAILY
Qty: 30 TABLET | Refills: 2 | Status: SHIPPED | OUTPATIENT
Start: 2022-12-05 | End: 2023-01-04

## 2022-12-05 NOTE — TELEPHONE ENCOUNTER
----- Message from Nancy Donahue sent at 12/5/2022  2:25 PM CST -----  Regarding: med refill  .Type:  RX Refill Request    Who Called:Lola, pharmacy  Refill or New Rx:Refill  RX Name and Strength:atorvastatin (LIPITOR) 80 MG tablet  How is the patient currently taking it? (ex. 1XDay):1xday  Is this a 30 day or 90 day RX:30  Preferred Pharmacy with phone number:[facility] [place]  Local or Mail Order:Local  Ordering Provider:Jaci  Would the patient rather a call back or a response via [facility]? Call back  Best Call Back Number:[phone]  Additional Information: Pharmacy stated they sent in request.

## 2023-01-23 ENCOUNTER — DOCUMENTATION ONLY (OUTPATIENT)
Dept: FAMILY MEDICINE | Facility: CLINIC | Age: 53
End: 2023-01-23
Payer: MEDICARE

## 2023-01-23 LAB
LEFT EYE DM RETINOPATHY: NEGATIVE
RIGHT EYE DM RETINOPATHY: NEGATIVE

## 2023-01-31 DIAGNOSIS — E78.5 DYSLIPIDEMIA: ICD-10-CM

## 2023-01-31 RX ORDER — FENOFIBRATE 160 MG/1
160 TABLET ORAL DAILY
Qty: 30 TABLET | Refills: 11 | Status: SHIPPED | OUTPATIENT
Start: 2023-01-31 | End: 2023-06-14 | Stop reason: SDUPTHER

## 2023-02-09 DIAGNOSIS — D72.819 LEUKOPENIA, UNSPECIFIED TYPE: Primary | ICD-10-CM

## 2023-02-09 NOTE — PROGRESS NOTES
Subjective:       Patient ID: Lamine Preciado is a 52 y.o. male.    Chief Complaint:  Pt is wanting to discuss genetic testing. (States his sister has breast cancer and would like to check on him and daughters.)      Diagnosis:  1. Leukopenia   2. Normocytic anemia     Current Treatment:   OTC Iron once/day.     Treatment History:  Not applicable    HPI:  Patient kindly referred by Dr. Gonzales for further evaluation of leukopenia along with mild/borderline normocytic anemia. The patient began developing signs of anemia and leukopenia in early 2016 with CBC in April of that year showed WBC of 2.6 and hemoglobin of 11.9, with relative decrease in his neutrophils.  He was followed periodically after that and had a white count that went up and down, never completely normalizing. His H&H improved during that time and additional testing for anemia such as iron studies B12 folate and haptoglobin all came back unremarkable.  The patient had labs done with Dr. Gonzales on November 2, 2017 which demonstrated mildly decreased hemoglobin of 12.4 WBC of 3.3, prompting referral to hematology for further workup.  Clinically, the patient presented at the time of his initial evaluation with us with increasing fatigue of approximately 3-6 months duration without any associated night sweats or unintentional weight loss, or any fevers chills or chronic infections.  Labs done December 7, 2017 show normal iron studies with serum iron of 106, TIBC 399, ferritin of 60.5. B12 and folate normal at 1630.9 respectively. Haptoglobin normal at 91. Serum protein electrophoresis and immunofixation unremarkable/negative.  Peripheral smear with nonspecific findings showing mild normochromic normocytic anemia with no schistocytes and slight increase in reactive lymphocytes with normal platelet number and morphology.  Reticulocyte count normal at 2.0. Mia antibody testing negative.  Additional lab tests including HIV and hepatitis panel all  negative. Rheumatoid factor normal at 10. SAMUEL negative with double-stranded DNA less than 1.  Ultrasound of the abdomen done December 15, 2017 shows normal size of the spleen and liver with fatty liver only.  BM Bx done 12/22/2017 noted mild normocytic, normochromic anemia, mild neutropenia. Normocellular marrow with trilineage hematopoiesis w/o specific abnormalities noted.  Patient's labs have essentially been stable since that time.            Interval History:   Patient here for scheduled follow up.   The patient continues to do well from a hematological standpoint.  He continues to fish without any limitations.  He does state that he has some chronic issues with depression and anxiety, he is aware of this and therefore tries to get out the house as often as possible.      Past Medical History:   Diagnosis Date    Bilateral carpal tunnel syndrome     Cervical radiculopathy     Chronic pain     Depression     DM2 (diabetes mellitus, type 2)     GERD (gastroesophageal reflux disease)     HTN (hypertension)     Leukopenia     Neurogenic bladder     Post laminectomy syndrome     Sleep disturbance     Snoring       Past Surgical History:   Procedure Laterality Date    APPENDECTOMY      CARPAL TUNNEL RELEASE      COLONOSCOPY  12/14/2015    SPINAL CORD STIMULATOR IMPLANT      TRIAL OF SPINAL CORD NERVE STIMULATOR       Social History     Socioeconomic History    Marital status:    Tobacco Use    Smoking status: Never    Smokeless tobacco: Never   Substance and Sexual Activity    Alcohol use: Not Currently    Drug use: Never    Sexual activity: Yes      Family History   Problem Relation Age of Onset    Heart disease Mother     Diabetes Mother     Hypertension Mother     Diabetes Father     Heart failure Father     Depression Sister       Review of patient's allergies indicates:  No Known Allergies   Review of Systems   Constitutional:  Negative for chills, diaphoresis, fatigue, fever and unexpected weight change.    HENT:  Negative for nasal congestion, mouth sores, sinus pressure/congestion and sore throat.    Eyes:  Negative for pain and visual disturbance.   Respiratory:  Negative for cough, chest tightness and shortness of breath.    Cardiovascular:  Negative for chest pain, palpitations and leg swelling.   Gastrointestinal:  Negative for abdominal distention, abdominal pain, blood in stool, constipation and diarrhea.   Genitourinary:  Negative for dysuria, frequency and hematuria.   Musculoskeletal:  Negative for arthralgias and back pain.   Integumentary:  Negative for rash.   Neurological:  Negative for dizziness, weakness, numbness and headaches.   Hematological:  Negative for adenopathy.   Psychiatric/Behavioral:  Negative for confusion.        Objective:      Physical Exam  Vitals reviewed.   Constitutional:       General: He is awake.      Appearance: Normal appearance.   HENT:      Head: Normocephalic and atraumatic.      Right Ear: Hearing normal.      Left Ear: Hearing normal.      Nose: Nose normal.   Eyes:      General: Lids are normal. Vision grossly intact.      Extraocular Movements: Extraocular movements intact.      Conjunctiva/sclera: Conjunctivae normal.   Cardiovascular:      Rate and Rhythm: Normal rate and regular rhythm.      Pulses: Normal pulses.      Heart sounds: Normal heart sounds.   Pulmonary:      Effort: Pulmonary effort is normal.      Breath sounds: Normal breath sounds. No wheezing, rhonchi or rales.   Abdominal:      General: Bowel sounds are normal.      Palpations: Abdomen is soft.      Tenderness: There is no abdominal tenderness.   Musculoskeletal:      Cervical back: Full passive range of motion without pain.      Right lower leg: No edema.      Left lower leg: No edema.   Lymphadenopathy:      Cervical: No cervical adenopathy.      Upper Body:      Right upper body: No supraclavicular or axillary adenopathy.      Left upper body: No supraclavicular or axillary adenopathy.    Skin:     General: Skin is warm.   Neurological:      General: No focal deficit present.      Mental Status: He is alert and oriented to person, place, and time.   Psychiatric:         Attention and Perception: Attention normal.         Mood and Affect: Mood and affect normal.         Behavior: Behavior is cooperative.       LABS AND IMAGING REVIEWED IN EPIC          Assessment:     1. Leukopenia   2. Normocytic anemia      Plan:         Continue with p.o. iron supplementation.     We will see the patient back in 1 year with a CBC, CMP     I explained that we would only repeat a bone marrow biopsy if his labs changed significantly or if his hemoglobin/hematocrit or platelet count started to decrease.     He knows to call sooner if any new signs or symptoms occur.     He voiced understanding with all questions answered      Seven Winter II, MD

## 2023-02-10 ENCOUNTER — LAB VISIT (OUTPATIENT)
Dept: LAB | Facility: HOSPITAL | Age: 53
End: 2023-02-10
Payer: MEDICARE

## 2023-02-10 DIAGNOSIS — D72.819 LEUKOPENIA, UNSPECIFIED TYPE: ICD-10-CM

## 2023-02-10 LAB
ALBUMIN SERPL-MCNC: 4.2 G/DL (ref 3.5–5)
ALBUMIN/GLOB SERPL: 1.7 RATIO (ref 1.1–2)
ALP SERPL-CCNC: 66 UNIT/L (ref 40–150)
ALT SERPL-CCNC: 36 UNIT/L (ref 0–55)
AST SERPL-CCNC: 31 UNIT/L (ref 5–34)
BASOPHILS # BLD AUTO: 0.02 X10(3)/MCL (ref 0–0.2)
BASOPHILS NFR BLD AUTO: 0.4 %
BILIRUBIN DIRECT+TOT PNL SERPL-MCNC: 0.4 MG/DL
BUN SERPL-MCNC: 19.2 MG/DL (ref 8.4–25.7)
CALCIUM SERPL-MCNC: 10.6 MG/DL (ref 8.4–10.2)
CHLORIDE SERPL-SCNC: 107 MMOL/L (ref 98–107)
CO2 SERPL-SCNC: 27 MMOL/L (ref 22–29)
CREAT SERPL-MCNC: 0.89 MG/DL (ref 0.73–1.18)
EOSINOPHIL # BLD AUTO: 0.05 X10(3)/MCL (ref 0–0.9)
EOSINOPHIL NFR BLD AUTO: 1 %
ERYTHROCYTE [DISTWIDTH] IN BLOOD BY AUTOMATED COUNT: 12.7 % (ref 11.5–17)
GFR SERPLBLD CREATININE-BSD FMLA CKD-EPI: >60 MLS/MIN/1.73/M2
GLOBULIN SER-MCNC: 2.5 GM/DL (ref 2.4–3.5)
GLUCOSE SERPL-MCNC: 108 MG/DL (ref 74–100)
HCT VFR BLD AUTO: 40.3 % (ref 42–52)
HGB BLD-MCNC: 12.9 GM/DL (ref 14–18)
IMM GRANULOCYTES # BLD AUTO: 0 X10(3)/MCL (ref 0–0.04)
IMM GRANULOCYTES NFR BLD AUTO: 0 %
LYMPHOCYTES # BLD AUTO: 1.58 X10(3)/MCL (ref 0.6–4.6)
LYMPHOCYTES NFR BLD AUTO: 32.6 %
MCH RBC QN AUTO: 30.4 PG
MCHC RBC AUTO-ENTMCNC: 32 MG/DL (ref 33–36)
MCV RBC AUTO: 95 FL (ref 80–94)
MONOCYTES # BLD AUTO: 0.35 X10(3)/MCL (ref 0.1–1.3)
MONOCYTES NFR BLD AUTO: 7.2 %
NEUTROPHILS # BLD AUTO: 2.84 X10(3)/MCL (ref 2.1–9.2)
NEUTROPHILS NFR BLD AUTO: 58.8 %
PLATELET # BLD AUTO: 186 X10(3)/MCL (ref 130–400)
PMV BLD AUTO: 8.7 FL (ref 7.4–10.4)
POTASSIUM SERPL-SCNC: 5 MMOL/L (ref 3.5–5.1)
PROT SERPL-MCNC: 6.7 GM/DL (ref 6.4–8.3)
RBC # BLD AUTO: 4.24 X10(6)/MCL (ref 4.7–6.1)
SODIUM SERPL-SCNC: 143 MMOL/L (ref 136–145)
WBC # SPEC AUTO: 4.8 X10(3)/MCL (ref 4.5–11.5)

## 2023-02-10 PROCEDURE — 85025 COMPLETE CBC W/AUTO DIFF WBC: CPT

## 2023-02-10 PROCEDURE — 36415 COLL VENOUS BLD VENIPUNCTURE: CPT

## 2023-02-10 PROCEDURE — 80053 COMPREHEN METABOLIC PANEL: CPT

## 2023-02-14 ENCOUNTER — OFFICE VISIT (OUTPATIENT)
Dept: HEMATOLOGY/ONCOLOGY | Facility: CLINIC | Age: 53
End: 2023-02-14
Payer: MEDICARE

## 2023-02-14 VITALS
HEIGHT: 67 IN | SYSTOLIC BLOOD PRESSURE: 123 MMHG | OXYGEN SATURATION: 97 % | TEMPERATURE: 98 F | BODY MASS INDEX: 28.09 KG/M2 | HEART RATE: 84 BPM | DIASTOLIC BLOOD PRESSURE: 79 MMHG | WEIGHT: 179 LBS

## 2023-02-14 DIAGNOSIS — D64.9 NORMOCYTIC ANEMIA: Primary | ICD-10-CM

## 2023-02-14 DIAGNOSIS — D72.819 LEUKOPENIA, UNSPECIFIED TYPE: ICD-10-CM

## 2023-02-14 PROCEDURE — 99214 OFFICE O/P EST MOD 30 MIN: CPT | Mod: PBBFAC | Performed by: INTERNAL MEDICINE

## 2023-02-14 PROCEDURE — 99999 PR PBB SHADOW E&M-EST. PATIENT-LVL IV: ICD-10-PCS | Mod: PBBFAC,,, | Performed by: INTERNAL MEDICINE

## 2023-02-14 PROCEDURE — 99213 PR OFFICE/OUTPT VISIT, EST, LEVL III, 20-29 MIN: ICD-10-PCS | Mod: S$PBB,,, | Performed by: INTERNAL MEDICINE

## 2023-02-14 PROCEDURE — 99999 PR PBB SHADOW E&M-EST. PATIENT-LVL IV: CPT | Mod: PBBFAC,,, | Performed by: INTERNAL MEDICINE

## 2023-02-14 PROCEDURE — 99213 OFFICE O/P EST LOW 20 MIN: CPT | Mod: S$PBB,,, | Performed by: INTERNAL MEDICINE

## 2023-02-14 RX ORDER — METFORMIN HYDROCHLORIDE 500 MG/1
500 TABLET ORAL
COMMUNITY
End: 2023-04-11 | Stop reason: SDUPTHER

## 2023-02-14 RX ORDER — TERAZOSIN 5 MG/1
5 CAPSULE ORAL NIGHTLY
COMMUNITY
End: 2023-04-11 | Stop reason: SDUPTHER

## 2023-02-14 RX ORDER — ATORVASTATIN CALCIUM 80 MG/1
80 TABLET, FILM COATED ORAL DAILY
COMMUNITY
End: 2023-03-21 | Stop reason: SDUPTHER

## 2023-03-22 RX ORDER — ATORVASTATIN CALCIUM 80 MG/1
80 TABLET, FILM COATED ORAL DAILY
Qty: 90 TABLET | Refills: 3 | Status: SHIPPED | OUTPATIENT
Start: 2023-03-22 | End: 2023-06-14 | Stop reason: SDUPTHER

## 2023-04-11 RX ORDER — METFORMIN HYDROCHLORIDE 500 MG/1
500 TABLET ORAL
Qty: 90 TABLET | Refills: 3 | Status: SHIPPED | OUTPATIENT
Start: 2023-04-11 | End: 2023-06-14 | Stop reason: SDUPTHER

## 2023-04-11 RX ORDER — TERAZOSIN 5 MG/1
5 CAPSULE ORAL NIGHTLY
Qty: 90 CAPSULE | Refills: 3 | Status: SHIPPED | OUTPATIENT
Start: 2023-04-11

## 2023-04-11 NOTE — TELEPHONE ENCOUNTER
----- Message from April Stewart sent at 4/11/2023  1:59 PM CDT -----  Regarding: med refill  .Type:  RX Refill Request    Who Called: Shreya ([facility])  Refill or New Rx: refill  RX Name and Strength: terazosin (HYTRIN) 5 MG capsule  How is the patient currently taking it? (ex. 1XDay): 1x daily  Is this a 30 day or 90 day RX:  Preferred Pharmacy with phone number: [id number] - [place], [place]  [address]  Local or Mail Order: local  Ordering Provider: Jaci  Would the patient rather a call back or a response via [facility]?  Call back  Best Call Back Number: [phone]  Additional Information: pharmacy needs both medications refilled for patient.       .Type:  RX Refill Request    Who Called: Shreya ([facility])  Refill or New Rx: refill  RX Name and Strength: metFORMIN (GLUCOPHAGE) 500 MG tablet  How is the patient currently taking it? (ex. 1XDay): 1x daily  Is this a 30 day or 90 day RX:  Preferred Pharmacy with phone number: [id number] - [place]  [address]  Local or Mail Order: local  Ordering Provider: Jaci  Would the patient rather a call back or a response via [facility]?  Call back  Best Call Back Number: [phone]  Additional Information: pharmacy needs both medications refilled for patient.

## 2023-06-13 NOTE — ASSESSMENT & PLAN NOTE
Lab Results   Component Value Date    HGBA1C 5.7 06/29/2022     Urine micro 5/2022  Foot exam today  Eye exam with dr. Whaley - will request    On statin and metformin. Continue on current meds.     Labs pending. Will call with results when available.  Consider holding metformin if labs stable. rtc in 6 months if med change with repeat labs.

## 2023-06-13 NOTE — PROGRESS NOTES
Patient ID: [id number]     Chief Complaint: Annual Exam (Wellness )      HPI:     Lamine Preciado is a 52 y.o. male here today for a Medicare Wellness. No other complaints today.     Patient presents to the clinic unaccompanied for his wellness visit.  He is due for labs.     He has carpal tunnel syndrome, chronic pain and cervical and lumbar radiculopathy.  He follows with Dr. Gore.  He had a stimulator placed in 2019 which has helped his symptoms however he was told he may need to get this updated.  He is asking for refill of his tramadol.  His last prescription of this was for # 20 Pills in 9/2022. He had carpal tunnel surgery on his left with Dr. blanco 6/13/19 and right 12/17/20.       He also has diabetes.  His last foot exam was today.  His eye exams are done with Dr. Whaley. 4/2023.  Last urine micro was 6/2022. Last A1c was 6.0 6/2022. He is currently on metformin 500 mg in the evening.  He does not check his sugars.  He is following a diabetic diet.       He also has hyperlipidemia and is on atorvastatin and fenofibrate.     He has BPH and neurogenic bladder.  His urologist is Dr. Burnett.  sees him annually in october. He had a TURP 12/2020. He is on VESIcare.  myrbetriq did not work well.     He has depression and anxiety and follows with Dr. Gorman. He is on Effexor, trazodone, and Klonopin.  Doing well.  Sees them every 3-4 months.  has appt 10/2023.       He also has normocytic anemia and leukopenia and is being followed by Hematology, Dr. Durán, annually. Has appt 2/2024.   He is on oral iron.  HE WAS SLIGHTLY MORE ANEMIC AND LEUKOPENIC VERSUS PREVIOUS.     He had a colonoscopy done with Dr. Summers 12/2015 which recommended a repeat in 10 years.      Past Surgical History:   Procedure Laterality Date    APPENDECTOMY      CARPAL TUNNEL RELEASE      COLONOSCOPY  12/14/2015    SPINAL CORD STIMULATOR IMPLANT      TRIAL OF SPINAL CORD NERVE STIMULATOR         Review of patient's allergies  indicates:  No Known Allergies    No outpatient medications have been marked as taking for the 6/14/23 encounter (Office Visit) with Gayathri Beatty MD.       Social History     Socioeconomic History    Marital status:    Tobacco Use    Smoking status: Never    Smokeless tobacco: Never   Substance and Sexual Activity    Alcohol use: Not Currently    Drug use: Never    Sexual activity: Yes        Family History   Problem Relation Age of Onset    Heart disease Mother     Diabetes Mother     Hypertension Mother     Diabetes Father     Heart failure Father     Depression Sister         Patient Care Team:  Gayathri Beatty MD as PCP - General (Family Medicine)  Seven Winter II, MD as Consulting Physician (Oncology)  Maycol Gore MD as Consulting Physician (Neurology)  Gene Whaley MD as Consulting Physician (Ophthalmology)  Sameer Ballesteros Jr., MD as Consulting Physician (Psychiatry)  Raymond Burnett MD as Consulting Physician (Urology)  Dejuan Summers MD as Consulting Physician (Gastroenterology)  Jay Nieto Jr., MD as Consulting Physician (Orthopedic Surgery)       Subjective:     Review of Systems   Constitutional: Negative.    HENT: Negative.     Eyes: Negative.    Respiratory: Negative.     Cardiovascular: Negative.    Gastrointestinal: Negative.    Genitourinary: Negative.    Musculoskeletal: Negative.    Skin: Negative.    Neurological: Negative.    Endo/Heme/Allergies: Negative.    Psychiatric/Behavioral: Negative.         Patient Reported Health Risk Assessment  What is your age?:  (52)  Are you male or female?: Male  During the past four weeks, how much have you been bothered by emotional problems such as feeling anxious, depressed, irritable, sad, or downhearted and blue?: Slightly  During the past five weeks, has your physical and/or emotional health limited your social activities with family, friends, neighbors, or groups?: Not at all  During the past four weeks, how much  bodily pain have you generally had?: Moderate pain  During the past four weeks, was someone available to help if you needed and wanted help?: Yes, as much as I wanted  During the past four weeks, what was the hardest physical activity you could do for at least two minutes?: Light  Can you get to places out of walking distance without help?  (For example, can you travel alone on buses or taxis, or drive your own car?): Yes  Can you go shopping for groceries or clothes without someone's help?: Yes  Can you prepare your own meals?: Yes  Can you do your own housework without help?: Yes  Because of any health problems, do you need the help of another person with your personal care needs such as eating, bathing, dressing, or getting around the house?: No  Can you handle your own money without help?: Yes  During the past four weeks, how would you rate your health in general?: Very good  How have things been going for you during the past four weeks?: Good and bad parts about equal  Are you having difficulties driving your car?: No  Do you always fasten your seat belt when you are in a car?: Yes, usually  How often in the past four weeks have you been bothered by falling or dizzy when standing up?: Sometimes  How often in the past four weeks have you been bothered by sexual problems?: Never  How often in the past four weeks have you been bothered by trouble eating well?: Never  How often in the past four weeks have you been bothered by teeth or denture problems?: Never  How often in the past four weeks have you been bothered with problems using the telephone?: Never  How often in the past four weeks have you been bothered by tiredness or fatigue?: Never  Have you fallen two or more times in the past year?: No  Are you afraid of falling?: No  Are you a smoker?: No  During the past four weeks, how many drinks of wine, beer, or other alcoholic beverages did you have?: No alcohol at all  Do you exercise for about 20 minutes  "three or more days a week?: Yes, most of the time  Have you been given any information to help you with hazards in your house that might hurt you?: No  Have you been given any information to help you with keeping track of your medications?: No  How often do you have trouble taking medicines the way you've been told to take them?: I always take them as prescribed  How confident are you that you can control and manage most of your health problems?: Very confident  What is your race? (Check all that apply.):     Objective:     /78 (BP Location: Left arm)   Pulse 67   Temp 98.3 °F (36.8 °C) (Temporal)   Resp 16   Ht 5' 7" (1.702 m)   Wt 82.8 kg (182 lb 8 oz)   SpO2 98%   BMI 28.58 kg/m²     Physical Exam  Vitals and nursing note reviewed.   Constitutional:       Appearance: Normal appearance. He is normal weight.   HENT:      Head: Normocephalic.      Nose: Nose normal.      Mouth/Throat:      Mouth: Mucous membranes are moist.      Pharynx: Oropharynx is clear.   Eyes:      Extraocular Movements: Extraocular movements intact.   Cardiovascular:      Rate and Rhythm: Normal rate and regular rhythm.      Pulses:           Dorsalis pedis pulses are 2+ on the right side and 2+ on the left side.        Posterior tibial pulses are 2+ on the right side and 2+ on the left side.   Pulmonary:      Effort: Pulmonary effort is normal.      Breath sounds: Normal breath sounds.   Musculoskeletal:         General: Normal range of motion.        Feet:    Feet:      Right foot:      Protective Sensation: 8 sites tested.  8 sites sensed.      Skin integrity: Skin integrity normal.      Left foot:      Protective Sensation: 8 sites tested.  8 sites sensed.      Skin integrity: Skin integrity normal.   Skin:     General: Skin is warm and dry.   Neurological:      General: No focal deficit present.      Mental Status: He is alert and oriented to person, place, and time. Mental status is at baseline.   Psychiatric:    "      Mood and Affect: Mood normal.         Checklist of Activities of Daily Living 6/13/2022   Bathing Independent   Dressing Independent   Grooming Independent   Oral Care Independent   Toileting Independent   Transferring Independent   Walking Independent   Climbing Stairs Independent   Eating Independent   Shopping Independent   Cooking Independent   Managing Medications Independent   Using the Phone Independent   Housework Indpendent   Laundry Independent   Driving Independent   Managing Finances Independent     Fall Risk Assessment - Outpatient 6/14/2023 2/14/2023 6/13/2022 5/23/2022   Mobility Status Ambulatory Ambulatory Ambulatory Ambulatory   Number of falls 0 0 0 0   Identified as fall risk 0 0 0 0           Depression Screening  Over the past two weeks, has the patient felt down, depressed, or hopeless?: No  Over the past two weeks, has the patient felt little interest or pleasure in doing things?: No  Functional Ability/Safety Screening  Was the patient's timed Up & Go test unsteady or longer than 30 seconds?: No  Does the patient need help with phone, transportation, shopping, preparing meals, housework, laundry, meds, or managing money?: No  Does the patient's home have rugs in the hallway, lack grab bars in the bathroom, lack handrails on the stairs or have poor lighting?: No  Have you noticed any hearing difficulties?: No  Cognitive Function (Assessed through direct observation with due consideration of information obtained by way of patient reports and/or concerns raised by family, friends, caretakers, or others)    Does the patient repeat questions/statements in the same day?: No  Does the patient have trouble remembering the date, year, and time?: No  Does the patient have difficulty managing finances?: No  Does the patient have a decreased sense of direction?: No  Assessment/Plan:       Medicare Annual Wellness and Personalized Prevention Plan:   Fall Risk + Home Safety + Hearing Impairment +  Depression Screen + Cognitive Impairment Screen + Health Risk Assessment all reviewed.     Opioid Screening: Patient medication list reviewed, patient is not taking prescription opioids. Patient is not using additional opioids than prescribed. Patient is at low risk of substance abuse based on this opioid use history.         Health Maintenance Topics with due status: Not Due       Topic Last Completion Date    Colorectal Cancer Screening 12/14/2015    TETANUS VACCINE 02/02/2017    Influenza Vaccine 12/03/2020    Lipid Panel 06/29/2022    Eye Exam 01/23/2023    Low Dose Statin 03/22/2023      The patient's Health Maintenance was reviewed and the following appears to be due at this time:   Health Maintenance Due   Topic Date Due    Foot Exam  Never done    COVID-19 Vaccine (3 - Mixed Product series) 06/03/2021    Hemoglobin A1c  12/29/2022    Diabetes Urine Screening  06/29/2023         1. Medicare annual wellness visit, subsequent  Assessment & Plan:  Fasting labs ordered. Will call with results when available.   Prostate Specific Antigen (ng/mL)   Date Value   06/29/2022 1.10     Prostate Specific Antigen Free (ng/mL)   Date Value   05/12/2022 0.14     PSA % Free (%)   Date Value   05/12/2022 14     utd on colonoscopy 12/2015 with dr. Diaz. Repeat due 12/2025.   Declines immunizations today     Advanced care planning discussed and paperwork given    Orders:  -     CBC Auto Differential; Future; Expected date: 06/14/2023  -     Comprehensive Metabolic Panel; Future; Expected date: 06/14/2023  -     Lipid Panel; Future; Expected date: 06/14/2023  -     TSH; Future; Expected date: 06/14/2023  -     Hemoglobin A1C; Future; Expected date: 06/14/2023  -     Urinalysis; Future; Expected date: 06/14/2023  -     PSA, Screening; Future; Expected date: 06/14/2023  -     Microalbumin/Creatinine Ratio, Urine; Future; Expected date: 06/14/2023    2. Advanced care planning/counseling discussion  Assessment & Plan:  Advanced care  planning discussed and paperwork given.    I attest that I have had a face to face discussion with patient and or surrogate decision maker.   Included surrogate decision maker: NO  Advanced directive in chart: NO  LAPOST: NO    Total time spent: 16 minutes      Orders:  -     CBC Auto Differential; Future; Expected date: 06/14/2023  -     Comprehensive Metabolic Panel; Future; Expected date: 06/14/2023  -     Lipid Panel; Future; Expected date: 06/14/2023  -     TSH; Future; Expected date: 06/14/2023  -     Hemoglobin A1C; Future; Expected date: 06/14/2023  -     Urinalysis; Future; Expected date: 06/14/2023  -     PSA, Screening; Future; Expected date: 06/14/2023  -     Microalbumin/Creatinine Ratio, Urine; Future; Expected date: 06/14/2023    3. Type 2 diabetes mellitus without complication, without long-term current use of insulin  Assessment & Plan:  Lab Results   Component Value Date    HGBA1C 5.7 06/29/2022     Urine micro 5/2022  Foot exam today  Eye exam with dr. Whaley - will request    On statin and metformin. Continue on current meds.     Labs pending. Will call with results when available.  Consider holding metformin if labs stable. rtc in 6 months if med change with repeat labs.     Orders:  -     CBC Auto Differential; Future; Expected date: 06/14/2023  -     Comprehensive Metabolic Panel; Future; Expected date: 06/14/2023  -     Lipid Panel; Future; Expected date: 06/14/2023  -     TSH; Future; Expected date: 06/14/2023  -     Hemoglobin A1C; Future; Expected date: 06/14/2023  -     Urinalysis; Future; Expected date: 06/14/2023  -     PSA, Screening; Future; Expected date: 06/14/2023  -     Microalbumin/Creatinine Ratio, Urine; Future; Expected date: 06/14/2023  -     metFORMIN (GLUCOPHAGE) 500 MG tablet; Take 1 tablet (500 mg total) by mouth daily with breakfast.  Dispense: 90 tablet; Refill: 3    4. Hyperlipidemia associated with type 2 diabetes mellitus  Assessment & Plan:  On statin and  fenofibrate. Well controlled    Orders:  -     CBC Auto Differential; Future; Expected date: 06/14/2023  -     Comprehensive Metabolic Panel; Future; Expected date: 06/14/2023  -     Lipid Panel; Future; Expected date: 06/14/2023  -     TSH; Future; Expected date: 06/14/2023  -     Hemoglobin A1C; Future; Expected date: 06/14/2023  -     Urinalysis; Future; Expected date: 06/14/2023  -     PSA, Screening; Future; Expected date: 06/14/2023  -     Microalbumin/Creatinine Ratio, Urine; Future; Expected date: 06/14/2023  -     atorvastatin (LIPITOR) 80 MG tablet; Take 1 tablet (80 mg total) by mouth once daily.  Dispense: 90 tablet; Refill: 3  -     fenofibrate 160 MG Tab; Take 1 tablet (160 mg total) by mouth once daily.  Dispense: 90 tablet; Refill: 3    5. Anxiety  Assessment & Plan:  See depression A&P    Orders:  -     CBC Auto Differential; Future; Expected date: 06/14/2023  -     Comprehensive Metabolic Panel; Future; Expected date: 06/14/2023  -     Lipid Panel; Future; Expected date: 06/14/2023  -     TSH; Future; Expected date: 06/14/2023  -     Hemoglobin A1C; Future; Expected date: 06/14/2023  -     Urinalysis; Future; Expected date: 06/14/2023  -     PSA, Screening; Future; Expected date: 06/14/2023  -     Microalbumin/Creatinine Ratio, Urine; Future; Expected date: 06/14/2023    6. Depressive disorder  Assessment & Plan:  Stable on current meds. Keep appointments with psych- dr. Gorman    Orders:  -     CBC Auto Differential; Future; Expected date: 06/14/2023  -     Comprehensive Metabolic Panel; Future; Expected date: 06/14/2023  -     Lipid Panel; Future; Expected date: 06/14/2023  -     TSH; Future; Expected date: 06/14/2023  -     Hemoglobin A1C; Future; Expected date: 06/14/2023  -     Urinalysis; Future; Expected date: 06/14/2023  -     PSA, Screening; Future; Expected date: 06/14/2023  -     Microalbumin/Creatinine Ratio, Urine; Future; Expected date: 06/14/2023    7. Benign prostatic hyperplasia,  unspecified whether lower urinary tract symptoms present  Assessment & Plan:  On terazosin. Keep appointments with urology    Orders:  -     CBC Auto Differential; Future; Expected date: 06/14/2023  -     Comprehensive Metabolic Panel; Future; Expected date: 06/14/2023  -     Lipid Panel; Future; Expected date: 06/14/2023  -     TSH; Future; Expected date: 06/14/2023  -     Hemoglobin A1C; Future; Expected date: 06/14/2023  -     Urinalysis; Future; Expected date: 06/14/2023  -     PSA, Screening; Future; Expected date: 06/14/2023  -     Microalbumin/Creatinine Ratio, Urine; Future; Expected date: 06/14/2023    8. Neurogenic bladder  Assessment & Plan:  Stable on vesicare. Keep appointments with urology    Orders:  -     CBC Auto Differential; Future; Expected date: 06/14/2023  -     Comprehensive Metabolic Panel; Future; Expected date: 06/14/2023  -     Lipid Panel; Future; Expected date: 06/14/2023  -     TSH; Future; Expected date: 06/14/2023  -     Hemoglobin A1C; Future; Expected date: 06/14/2023  -     Urinalysis; Future; Expected date: 06/14/2023  -     PSA, Screening; Future; Expected date: 06/14/2023  -     Microalbumin/Creatinine Ratio, Urine; Future; Expected date: 06/14/2023    9. Normocytic anemia  Assessment & Plan:  On oral iron and b12.  Keep appointments with adeline swenson    Orders:  -     CBC Auto Differential; Future; Expected date: 06/14/2023  -     Comprehensive Metabolic Panel; Future; Expected date: 06/14/2023  -     Lipid Panel; Future; Expected date: 06/14/2023  -     TSH; Future; Expected date: 06/14/2023  -     Hemoglobin A1C; Future; Expected date: 06/14/2023  -     Urinalysis; Future; Expected date: 06/14/2023  -     PSA, Screening; Future; Expected date: 06/14/2023  -     Microalbumin/Creatinine Ratio, Urine; Future; Expected date: 06/14/2023    10. Leukopenia, unspecified type  Assessment & Plan:  Keep appointments with adeline swenson    Orders:  -     CBC Auto Differential;  Future; Expected date: 06/14/2023  -     Comprehensive Metabolic Panel; Future; Expected date: 06/14/2023  -     Lipid Panel; Future; Expected date: 06/14/2023  -     TSH; Future; Expected date: 06/14/2023  -     Hemoglobin A1C; Future; Expected date: 06/14/2023  -     Urinalysis; Future; Expected date: 06/14/2023  -     PSA, Screening; Future; Expected date: 06/14/2023  -     Microalbumin/Creatinine Ratio, Urine; Future; Expected date: 06/14/2023    11. Chronic pain syndrome  Assessment & Plan:  Keep appointments with neuro.  isabel reviewed.  Asking for tramadol refill. Last fill was9/2022 for #20.  Will fill.  Reminded patient to use sparingly and only prn.  Cautioned may cause drowsiness and therefore do not take before work or driving. Patient is agreeable and verbalized understanding    Orders:  -     CBC Auto Differential; Future; Expected date: 06/14/2023  -     Comprehensive Metabolic Panel; Future; Expected date: 06/14/2023  -     Lipid Panel; Future; Expected date: 06/14/2023  -     TSH; Future; Expected date: 06/14/2023  -     Hemoglobin A1C; Future; Expected date: 06/14/2023  -     Urinalysis; Future; Expected date: 06/14/2023  -     PSA, Screening; Future; Expected date: 06/14/2023  -     Microalbumin/Creatinine Ratio, Urine; Future; Expected date: 06/14/2023  -     traMADoL (ULTRAM) 50 mg tablet; Take 1 tablet (50 mg total) by mouth every 8 (eight) hours as needed for Pain.  Dispense: 20 tablet; Refill: 0    12. Carpal tunnel syndrome, unspecified laterality  Assessment & Plan:  See chronic pain A&P    Orders:  -     CBC Auto Differential; Future; Expected date: 06/14/2023  -     Comprehensive Metabolic Panel; Future; Expected date: 06/14/2023  -     Lipid Panel; Future; Expected date: 06/14/2023  -     TSH; Future; Expected date: 06/14/2023  -     Hemoglobin A1C; Future; Expected date: 06/14/2023  -     Urinalysis; Future; Expected date: 06/14/2023  -     PSA, Screening; Future; Expected date:  06/14/2023  -     Microalbumin/Creatinine Ratio, Urine; Future; Expected date: 06/14/2023    13. Cervical radiculopathy  Assessment & Plan:  See chronic pain A&P    Orders:  -     CBC Auto Differential; Future; Expected date: 06/14/2023  -     Comprehensive Metabolic Panel; Future; Expected date: 06/14/2023  -     Lipid Panel; Future; Expected date: 06/14/2023  -     TSH; Future; Expected date: 06/14/2023  -     Hemoglobin A1C; Future; Expected date: 06/14/2023  -     Urinalysis; Future; Expected date: 06/14/2023  -     PSA, Screening; Future; Expected date: 06/14/2023  -     Microalbumin/Creatinine Ratio, Urine; Future; Expected date: 06/14/2023    14. Overweight (BMI 25.0-29.9)  Assessment & Plan:  Encouraged lifestyle change    Orders:  -     CBC Auto Differential; Future; Expected date: 06/14/2023  -     Comprehensive Metabolic Panel; Future; Expected date: 06/14/2023  -     Lipid Panel; Future; Expected date: 06/14/2023  -     TSH; Future; Expected date: 06/14/2023  -     Hemoglobin A1C; Future; Expected date: 06/14/2023  -     Urinalysis; Future; Expected date: 06/14/2023  -     PSA, Screening; Future; Expected date: 06/14/2023  -     Microalbumin/Creatinine Ratio, Urine; Future; Expected date: 06/14/2023    15. Encounter for screening for malignant neoplasm of prostate  -     PSA, Screening; Future; Expected date: 06/14/2023    16. Dyslipidemia         Advance Care Planning   I attest to discussing Advance Care Planning with patient and/or family member.  Education was provided including the importance of the Health Care Power of , Advance Directives, and/or [place] documentation.  The patient expressed understanding to the importance of this information and discussion.  Length of ACP conversation in minutes: 16         Medication List with Changes/Refills   Current Medications    ASPIRIN (ECOTRIN) 81 MG EC TABLET    Take 81 mg by mouth once daily.       Start Date: --        End Date: --    CLONAZEPAM  (KLONOPIN) 0.5 MG TABLET    Take 0.5 mg by mouth 2 (two) times daily.       Start Date: 3/10/2022 End Date: --    DOCUSATE SODIUM (COLACE ORAL)    Take by mouth.       Start Date: --        End Date: --    IRON,CARB/VIT C/VIT B12/FOLIC (IRON 100 PLUS ORAL)    Take by mouth.       Start Date: --        End Date: --    MECOBALAMIN (B12 ACTIVE ORAL)    Take by mouth.       Start Date: --        End Date: --    MELATONIN 10 MG TAB    Take by mouth.       Start Date: --        End Date: --    MULTIVIT-MINERALS/FA/LYCOPENE (ONE-A-DAY MEN'S ORAL)    Take by mouth.       Start Date: --        End Date: --    PREDNISOLONE ACETATE (PRED FORTE) 1 % DRPS    Place into both eyes.       Start Date: 6/13/2023 End Date: --    SOLIFENACIN (VESICARE) 10 MG TABLET    Take 10 mg by mouth once daily.       Start Date: --        End Date: --    TERAZOSIN (HYTRIN) 5 MG CAPSULE    Take 1 capsule (5 mg total) by mouth every evening.       Start Date: 4/11/2023 End Date: --    TRAZODONE (DESYREL) 150 MG TABLET    nightly.       Start Date: 4/25/2022 End Date: --    VENLAFAXINE (EFFEXOR-XR) 150 MG CP24    Take 150 mg by mouth 2 (two) times a day.       Start Date: 12/13/2021End Date: --   Changed and/or Refilled Medications    Modified Medication Previous Medication    ATORVASTATIN (LIPITOR) 80 MG TABLET atorvastatin (LIPITOR) 80 MG tablet       Take 1 tablet (80 mg total) by mouth once daily.    Take 1 tablet (80 mg total) by mouth once daily.       Start Date: 6/14/2023 End Date: --    Start Date: 3/22/2023 End Date: 6/14/2023    FENOFIBRATE 160 MG TAB fenofibrate 160 MG Tab       Take 1 tablet (160 mg total) by mouth once daily.    Take 1 tablet (160 mg total) by mouth once daily.       Start Date: 6/14/2023 End Date: 6/13/2024    Start Date: 1/31/2023 End Date: 6/14/2023    METFORMIN (GLUCOPHAGE) 500 MG TABLET metFORMIN (GLUCOPHAGE) 500 MG tablet       Take 1 tablet (500 mg total) by mouth daily with breakfast.    Take 1 tablet (500 mg  total) by mouth daily with breakfast.       Start Date: 6/14/2023 End Date: --    Start Date: 4/11/2023 End Date: 6/14/2023    TRAMADOL (ULTRAM) 50 MG TABLET traMADoL (ULTRAM) 50 mg tablet       Take 1 tablet (50 mg total) by mouth every 8 (eight) hours as needed for Pain.    Take 1 tablet (50 mg total) by mouth every 8 (eight) hours as needed for Pain.       Start Date: 6/14/2023 End Date: --    Start Date: 9/19/2022 End Date: 6/14/2023   Discontinued Medications    HYDROCHLOROTHIAZIDE (HYDRODIURIL) 25 MG TABLET           Start Date: 4/25/2022 End Date: 6/14/2023        Follow up in about 1 year (around 6/14/2024) for Medicare Wellness with labs. In addition to their scheduled follow up, the patient has also been instructed to follow up on as needed basis.

## 2023-06-14 ENCOUNTER — OFFICE VISIT (OUTPATIENT)
Dept: FAMILY MEDICINE | Facility: CLINIC | Age: 53
End: 2023-06-14
Payer: MEDICARE

## 2023-06-14 ENCOUNTER — LAB VISIT (OUTPATIENT)
Dept: LAB | Facility: HOSPITAL | Age: 53
End: 2023-06-14
Attending: FAMILY MEDICINE
Payer: MEDICARE

## 2023-06-14 VITALS
TEMPERATURE: 98 F | OXYGEN SATURATION: 98 % | HEIGHT: 67 IN | SYSTOLIC BLOOD PRESSURE: 110 MMHG | HEART RATE: 67 BPM | DIASTOLIC BLOOD PRESSURE: 78 MMHG | RESPIRATION RATE: 16 BRPM | BODY MASS INDEX: 28.64 KG/M2 | WEIGHT: 182.5 LBS

## 2023-06-14 DIAGNOSIS — G56.00 CARPAL TUNNEL SYNDROME, UNSPECIFIED LATERALITY: ICD-10-CM

## 2023-06-14 DIAGNOSIS — F32.A DEPRESSIVE DISORDER: ICD-10-CM

## 2023-06-14 DIAGNOSIS — Z12.5 ENCOUNTER FOR SCREENING FOR MALIGNANT NEOPLASM OF PROSTATE: ICD-10-CM

## 2023-06-14 DIAGNOSIS — Z71.89 ADVANCED CARE PLANNING/COUNSELING DISCUSSION: ICD-10-CM

## 2023-06-14 DIAGNOSIS — E11.69 HYPERLIPIDEMIA ASSOCIATED WITH TYPE 2 DIABETES MELLITUS: ICD-10-CM

## 2023-06-14 DIAGNOSIS — G89.4 CHRONIC PAIN SYNDROME: ICD-10-CM

## 2023-06-14 DIAGNOSIS — M54.12 CERVICAL RADICULOPATHY: ICD-10-CM

## 2023-06-14 DIAGNOSIS — E11.9 TYPE 2 DIABETES MELLITUS WITHOUT COMPLICATION, WITHOUT LONG-TERM CURRENT USE OF INSULIN: ICD-10-CM

## 2023-06-14 DIAGNOSIS — N40.0 BENIGN PROSTATIC HYPERPLASIA, UNSPECIFIED WHETHER LOWER URINARY TRACT SYMPTOMS PRESENT: ICD-10-CM

## 2023-06-14 DIAGNOSIS — D72.819 LEUKOPENIA, UNSPECIFIED TYPE: ICD-10-CM

## 2023-06-14 DIAGNOSIS — Z00.00 MEDICARE ANNUAL WELLNESS VISIT, SUBSEQUENT: ICD-10-CM

## 2023-06-14 DIAGNOSIS — E78.5 HYPERLIPIDEMIA ASSOCIATED WITH TYPE 2 DIABETES MELLITUS: ICD-10-CM

## 2023-06-14 DIAGNOSIS — F41.9 ANXIETY: ICD-10-CM

## 2023-06-14 DIAGNOSIS — E66.3 OVERWEIGHT (BMI 25.0-29.9): ICD-10-CM

## 2023-06-14 DIAGNOSIS — Z00.00 MEDICARE ANNUAL WELLNESS VISIT, SUBSEQUENT: Primary | ICD-10-CM

## 2023-06-14 DIAGNOSIS — E78.5 DYSLIPIDEMIA: ICD-10-CM

## 2023-06-14 DIAGNOSIS — N31.9 NEUROGENIC BLADDER: ICD-10-CM

## 2023-06-14 DIAGNOSIS — D64.9 NORMOCYTIC ANEMIA: ICD-10-CM

## 2023-06-14 LAB
APPEARANCE UR: CLEAR
BILIRUB UR QL STRIP.AUTO: NEGATIVE MG/DL
COLOR UR: YELLOW
CREAT UR-MCNC: 100.6 MG/DL (ref 63–166)
GLUCOSE UR QL STRIP.AUTO: NEGATIVE MG/DL
KETONES UR QL STRIP.AUTO: NEGATIVE MG/DL
LEUKOCYTE ESTERASE UR QL STRIP.AUTO: NEGATIVE UNIT/L
MICROALBUMIN UR-MCNC: 9.4 UG/ML
MICROALBUMIN/CREAT RATIO PNL UR: 9.3 MG/GM CR (ref 0–30)
NITRITE UR QL STRIP.AUTO: NEGATIVE
PH UR STRIP.AUTO: 6 [PH]
PROT UR QL STRIP.AUTO: NEGATIVE MG/DL
RBC UR QL AUTO: NEGATIVE UNIT/L
SP GR UR STRIP.AUTO: 1.02
UROBILINOGEN UR STRIP-ACNC: 0.2 MG/DL

## 2023-06-14 PROCEDURE — 3074F PR MOST RECENT SYSTOLIC BLOOD PRESSURE < 130 MM HG: ICD-10-PCS | Mod: CPTII,,, | Performed by: FAMILY MEDICINE

## 2023-06-14 PROCEDURE — 1160F PR REVIEW ALL MEDS BY PRESCRIBER/CLIN PHARMACIST DOCUMENTED: ICD-10-PCS | Mod: CPTII,,, | Performed by: FAMILY MEDICINE

## 2023-06-14 PROCEDURE — 3078F DIAST BP <80 MM HG: CPT | Mod: CPTII,,, | Performed by: FAMILY MEDICINE

## 2023-06-14 PROCEDURE — 3008F PR BODY MASS INDEX (BMI) DOCUMENTED: ICD-10-PCS | Mod: CPTII,,, | Performed by: FAMILY MEDICINE

## 2023-06-14 PROCEDURE — G0439 PPPS, SUBSEQ VISIT: HCPCS | Mod: ,,, | Performed by: FAMILY MEDICINE

## 2023-06-14 PROCEDURE — 1159F MED LIST DOCD IN RCRD: CPT | Mod: CPTII,,, | Performed by: FAMILY MEDICINE

## 2023-06-14 PROCEDURE — 1159F PR MEDICATION LIST DOCUMENTED IN MEDICAL RECORD: ICD-10-PCS | Mod: CPTII,,, | Performed by: FAMILY MEDICINE

## 2023-06-14 PROCEDURE — 3008F BODY MASS INDEX DOCD: CPT | Mod: CPTII,,, | Performed by: FAMILY MEDICINE

## 2023-06-14 PROCEDURE — G0439 PR MEDICARE ANNUAL WELLNESS SUBSEQUENT VISIT: ICD-10-PCS | Mod: ,,, | Performed by: FAMILY MEDICINE

## 2023-06-14 PROCEDURE — 3074F SYST BP LT 130 MM HG: CPT | Mod: CPTII,,, | Performed by: FAMILY MEDICINE

## 2023-06-14 PROCEDURE — 1160F RVW MEDS BY RX/DR IN RCRD: CPT | Mod: CPTII,,, | Performed by: FAMILY MEDICINE

## 2023-06-14 PROCEDURE — 82043 UR ALBUMIN QUANTITATIVE: CPT

## 2023-06-14 PROCEDURE — 3078F PR MOST RECENT DIASTOLIC BLOOD PRESSURE < 80 MM HG: ICD-10-PCS | Mod: CPTII,,, | Performed by: FAMILY MEDICINE

## 2023-06-14 RX ORDER — PREDNISOLONE ACETATE 10 MG/ML
SUSPENSION/ DROPS OPHTHALMIC
COMMUNITY
Start: 2023-06-13

## 2023-06-14 RX ORDER — ATORVASTATIN CALCIUM 80 MG/1
80 TABLET, FILM COATED ORAL DAILY
Qty: 90 TABLET | Refills: 3 | Status: SHIPPED | OUTPATIENT
Start: 2023-06-14

## 2023-06-14 RX ORDER — FENOFIBRATE 160 MG/1
160 TABLET ORAL DAILY
Qty: 90 TABLET | Refills: 3 | Status: SHIPPED | OUTPATIENT
Start: 2023-06-14 | End: 2024-06-13

## 2023-06-14 RX ORDER — TRAMADOL HYDROCHLORIDE 50 MG/1
50 TABLET ORAL EVERY 8 HOURS PRN
Qty: 20 TABLET | Refills: 0 | Status: SHIPPED | OUTPATIENT
Start: 2023-06-14

## 2023-06-14 RX ORDER — METFORMIN HYDROCHLORIDE 500 MG/1
500 TABLET ORAL
Qty: 90 TABLET | Refills: 3 | Status: SHIPPED | OUTPATIENT
Start: 2023-06-14

## 2023-06-14 NOTE — ASSESSMENT & PLAN NOTE
Fasting labs ordered. Will call with results when available.   Prostate Specific Antigen (ng/mL)   Date Value   06/29/2022 1.10     Prostate Specific Antigen Free (ng/mL)   Date Value   05/12/2022 0.14     PSA % Free (%)   Date Value   05/12/2022 14     utd on colonoscopy 12/2015 with dr. Diaz. Repeat due 12/2025.   Declines immunizations today     Advanced care planning discussed and paperwork given

## 2023-06-14 NOTE — ASSESSMENT & PLAN NOTE
Keep appointments with neuro.  isabel reviewed.  Asking for tramadol refill. Last fill was9/2022 for #20.  Will fill.  Reminded patient to use sparingly and only prn.  Cautioned may cause drowsiness and therefore do not take before work or driving. Patient is agreeable and verbalized understanding

## 2023-07-05 ENCOUNTER — OFFICE VISIT (OUTPATIENT)
Dept: ORTHOPEDICS | Facility: CLINIC | Age: 53
End: 2023-07-05
Payer: MEDICARE

## 2023-07-05 ENCOUNTER — LAB VISIT (OUTPATIENT)
Dept: LAB | Facility: HOSPITAL | Age: 53
End: 2023-07-05
Attending: NURSE PRACTITIONER
Payer: MEDICARE

## 2023-07-05 ENCOUNTER — OFFICE VISIT (OUTPATIENT)
Dept: URGENT CARE | Facility: CLINIC | Age: 53
End: 2023-07-05
Payer: MEDICARE

## 2023-07-05 VITALS
WEIGHT: 174 LBS | DIASTOLIC BLOOD PRESSURE: 81 MMHG | OXYGEN SATURATION: 97 % | HEART RATE: 88 BPM | HEIGHT: 67 IN | TEMPERATURE: 99 F | BODY MASS INDEX: 27.31 KG/M2 | SYSTOLIC BLOOD PRESSURE: 121 MMHG | RESPIRATION RATE: 18 BRPM

## 2023-07-05 VITALS — BODY MASS INDEX: 27.31 KG/M2 | HEIGHT: 67 IN | WEIGHT: 174 LBS

## 2023-07-05 DIAGNOSIS — M25.569 KNEE PAIN, UNSPECIFIED CHRONICITY, UNSPECIFIED LATERALITY: Primary | ICD-10-CM

## 2023-07-05 DIAGNOSIS — M25.462 SWELLING OF JOINT, KNEE, LEFT: Primary | ICD-10-CM

## 2023-07-05 DIAGNOSIS — M25.462 PAIN AND SWELLING OF KNEE, LEFT: ICD-10-CM

## 2023-07-05 DIAGNOSIS — M25.562 PAIN AND SWELLING OF KNEE, LEFT: ICD-10-CM

## 2023-07-05 DIAGNOSIS — M25.462 SWELLING OF JOINT, KNEE, LEFT: ICD-10-CM

## 2023-07-05 LAB
BASOPHILS # BLD AUTO: 0.02 X10(3)/MCL
BASOPHILS NFR BLD AUTO: 0.3 %
CRP SERPL HS-MCNC: 9 MG/L
EOSINOPHIL # BLD AUTO: 0.04 X10(3)/MCL (ref 0–0.9)
EOSINOPHIL NFR BLD AUTO: 0.7 %
ERYTHROCYTE [DISTWIDTH] IN BLOOD BY AUTOMATED COUNT: 12.3 % (ref 11.5–17)
ERYTHROCYTE [SEDIMENTATION RATE] IN BLOOD: 16 MM/HR (ref 0–15)
HCT VFR BLD AUTO: 41.1 % (ref 42–52)
HGB BLD-MCNC: 13.9 G/DL (ref 14–18)
IMM GRANULOCYTES # BLD AUTO: 0.01 X10(3)/MCL (ref 0–0.04)
IMM GRANULOCYTES NFR BLD AUTO: 0.2 %
LYMPHOCYTES # BLD AUTO: 1.44 X10(3)/MCL (ref 0.6–4.6)
LYMPHOCYTES NFR BLD AUTO: 24.2 %
MCH RBC QN AUTO: 31.2 PG (ref 27–31)
MCHC RBC AUTO-ENTMCNC: 33.8 G/DL (ref 33–36)
MCV RBC AUTO: 92.4 FL (ref 80–94)
MONOCYTES # BLD AUTO: 0.51 X10(3)/MCL (ref 0.1–1.3)
MONOCYTES NFR BLD AUTO: 8.6 %
NEUTROPHILS # BLD AUTO: 3.93 X10(3)/MCL (ref 2.1–9.2)
NEUTROPHILS NFR BLD AUTO: 66 %
NRBC BLD AUTO-RTO: 0 %
PLATELET # BLD AUTO: 284 X10(3)/MCL (ref 130–400)
PMV BLD AUTO: 8.8 FL (ref 7.4–10.4)
RBC # BLD AUTO: 4.45 X10(6)/MCL (ref 4.7–6.1)
URATE SERPL-MCNC: 3.9 MG/DL (ref 3.5–7.2)
WBC # SPEC AUTO: 5.95 X10(3)/MCL (ref 4.5–11.5)

## 2023-07-05 PROCEDURE — 99213 PR OFFICE/OUTPT VISIT, EST, LEVL III, 20-29 MIN: ICD-10-PCS | Mod: ,,,

## 2023-07-05 PROCEDURE — 84550 ASSAY OF BLOOD/URIC ACID: CPT

## 2023-07-05 PROCEDURE — 3061F NEG MICROALBUMINURIA REV: CPT | Mod: CPTII,,, | Performed by: NURSE PRACTITIONER

## 2023-07-05 PROCEDURE — 3066F PR DOCUMENTATION OF TREATMENT FOR NEPHROPATHY: ICD-10-PCS | Mod: CPTII,,, | Performed by: NURSE PRACTITIONER

## 2023-07-05 PROCEDURE — 36415 COLL VENOUS BLD VENIPUNCTURE: CPT

## 2023-07-05 PROCEDURE — 86141 C-REACTIVE PROTEIN HS: CPT

## 2023-07-05 PROCEDURE — 99213 OFFICE O/P EST LOW 20 MIN: CPT | Mod: ,,,

## 2023-07-05 PROCEDURE — 3061F PR NEG MICROALBUMINURIA RESULT DOCUMENTED/REVIEW: ICD-10-PCS | Mod: CPTII,,, | Performed by: NURSE PRACTITIONER

## 2023-07-05 PROCEDURE — 99213 PR OFFICE/OUTPT VISIT, EST, LEVL III, 20-29 MIN: ICD-10-PCS | Mod: ,,, | Performed by: NURSE PRACTITIONER

## 2023-07-05 PROCEDURE — 1159F PR MEDICATION LIST DOCUMENTED IN MEDICAL RECORD: ICD-10-PCS | Mod: CPTII,,, | Performed by: NURSE PRACTITIONER

## 2023-07-05 PROCEDURE — 3066F NEPHROPATHY DOC TX: CPT | Mod: CPTII,,, | Performed by: NURSE PRACTITIONER

## 2023-07-05 PROCEDURE — 1159F MED LIST DOCD IN RCRD: CPT | Mod: CPTII,,, | Performed by: NURSE PRACTITIONER

## 2023-07-05 PROCEDURE — 85025 COMPLETE CBC W/AUTO DIFF WBC: CPT

## 2023-07-05 PROCEDURE — 99213 OFFICE O/P EST LOW 20 MIN: CPT | Mod: ,,, | Performed by: NURSE PRACTITIONER

## 2023-07-05 PROCEDURE — 85652 RBC SED RATE AUTOMATED: CPT

## 2023-07-05 PROCEDURE — 3008F BODY MASS INDEX DOCD: CPT | Mod: CPTII,,, | Performed by: NURSE PRACTITIONER

## 2023-07-05 PROCEDURE — 3008F PR BODY MASS INDEX (BMI) DOCUMENTED: ICD-10-PCS | Mod: CPTII,,, | Performed by: NURSE PRACTITIONER

## 2023-07-05 NOTE — PROGRESS NOTES
Chief Complaint:   Chief Complaint   Patient presents with    Knee Problem     left knee fluid/swelling. urgent care visit notes in chart. xray also in chart.        Consulting Physician: No ref. provider found    History of present illness:    he  is a pleasant 52 y.o. year old male  with right knee pain since 6/30/23. He denies injury. He reports his knee and proximal tibia are swollen. He has pain mainly medial and lateral on the proximal tibia. His knee joint itself is not painful but feels full. He denies fevers. He does have some warmth. He denies a history of gout. He was seen at an urgent care where they recommended he present to the ER for aspiration. He was seen in clinic. He reports his pain and swelling are improved since this morning.     Past Medical History:   Diagnosis Date    Cervical radiculopathy     GERD (gastroesophageal reflux disease)     Leukopenia     Neurogenic bladder     Post laminectomy syndrome     Sleep disturbance     Snoring        Past Surgical History:   Procedure Laterality Date    APPENDECTOMY      CARPAL TUNNEL RELEASE      COLONOSCOPY  12/14/2015    SPINAL CORD STIMULATOR IMPLANT      TRIAL OF SPINAL CORD NERVE STIMULATOR         Current Outpatient Medications   Medication Sig    aspirin (ECOTRIN) 81 MG EC tablet Take 81 mg by mouth once daily.    atorvastatin (LIPITOR) 80 MG tablet Take 1 tablet (80 mg total) by mouth once daily.    clonazePAM (KLONOPIN) 0.5 MG tablet Take 0.5 mg by mouth 2 (two) times daily.    docusate sodium (COLACE ORAL) Take by mouth.    fenofibrate 160 MG Tab Take 1 tablet (160 mg total) by mouth once daily.    iron,carb/vit C/vit B12/folic (IRON 100 PLUS ORAL) Take by mouth.    mecobalamin (B12 ACTIVE ORAL) Take by mouth.    melatonin 10 mg Tab Take by mouth.    metFORMIN (GLUCOPHAGE) 500 MG tablet Take 1 tablet (500 mg total) by mouth daily with breakfast.    multivit-minerals/FA/lycopene (ONE-A-DAY MEN'S ORAL) Take by mouth.    prednisoLONE acetate  "(PRED FORTE) 1 % DrpS Place into both eyes.    solifenacin (VESICARE) 10 MG tablet Take 10 mg by mouth once daily.    terazosin (HYTRIN) 5 MG capsule Take 1 capsule (5 mg total) by mouth every evening.    traMADoL (ULTRAM) 50 mg tablet Take 1 tablet (50 mg total) by mouth every 8 (eight) hours as needed for Pain.    traZODone (DESYREL) 150 MG tablet nightly.    venlafaxine (EFFEXOR-XR) 150 MG Cp24 Take 150 mg by mouth 2 (two) times a day.     No current facility-administered medications for this visit.       Review of patient's allergies indicates:  No Known Allergies    Family History   Problem Relation Age of Onset    Heart disease Mother     Diabetes Mother     Hypertension Mother     Diabetes Father     Heart failure Father     Depression Sister        Social History     Socioeconomic History    Marital status:    Tobacco Use    Smoking status: Never    Smokeless tobacco: Never   Substance and Sexual Activity    Alcohol use: Not Currently    Drug use: Never    Sexual activity: Yes       Review of Systems:    Constitution:   Denies chills, fever, and sweats.  HENT:   Denies headaches or blurry vision.  Cardiovascular:  Denies chest pain or irregular heart beat.  Respiratory:   Denies cough or shortness of breath.  Gastrointestinal:  Denies abdominal pain, nausea, or vomiting.  Musculoskeletal:   Denies muscle cramps.  Neurological:   Denies dizziness or focal weakness.  Psychiatric/Behavior: Normal mental status.  Hematology/Lymph:  Denies bleeding problem or easy bruising/bleeding.  Skin:    Denies rash or suspicious lesions.    Examination:    Vital Signs:    Vitals:    07/05/23 1306   Weight: 78.9 kg (174 lb)   Height: 5' 7" (1.702 m)       Body mass index is 27.25 kg/m².    Constitution:   Well-developed, well nourished patient in no acute distress.  Neurological:   Alert and oriented x 3 and cooperative to examination.     Psychiatric/Behavior: Normal mental status.  Respiratory:   No shortness of " breath.  Cards:    Pulses palpable and symmetric, brisk cap refill   Eyes:    Extraoccular muscles intact  Skin:    No scars, rash or suspicious lesions.    MSK:   Standing exam  stance: normal alignment, no significant leg-length discrepancy  gait: antalgic limp    Knee examination  - General comments: minimal swelling in knee joint. +swelling to proximal tibia. + warmth. No erythema    - Tenderness: medial and lateral tibia     Knee                  RIGHT    LEFT  Skin:                  Intact      Intact  ROM:                 0-130      0-130  Effusion:             Neg          +  MJL TTP:           Neg         Neg  LJL TTP:            Neg         Neg  Jones:         Neg         Neg  Pat crep:            Neg         Neg  Patella TTPs:     Neg         Neg  Patella grind:      Neg        Neg  Lachman:           Neg        Neg  Pivot shift:          Neg        Neg  Valgus stress:    Neg        Neg  Varus stress:      Neg        Neg  Posterior drawer: Neg       Neg    N-V intact intact  Hip: nml nml    Lower extremity edema:Negative       Imaging: previous xrays show normal bony alignment.         Assessment: Swelling of joint, knee, left  -     MRI Knee Without Contrast Left; Future  -     CBC Auto Differential; Future; Expected date: 07/05/2023  -     C-reactive protein; Future; Expected date: 07/05/2023  -     Sedimentation rate; Future; Expected date: 07/05/2023        Plan: Concern for infection vs gout vs inflammation. Aspiration attempted, no fluid drawn, patient requested to stop. We will get infection labs as well as uric acid. MRI to evaluate further. We will see him back after for results.

## 2023-07-05 NOTE — PROGRESS NOTES
"Subjective:      Patient ID: Lamine Preciado is a 52 y.o. male.    Vitals:  height is 5' 7" (1.702 m) and weight is 78.9 kg (174 lb). His oral temperature is 98.6 °F (37 °C). His blood pressure is 121/81 and his pulse is 88. His respiration is 18 and oxygen saturation is 97%.     Chief Complaint: Knee Pain (Left knee pain x 4 days, swelling, warm to touch, this morning, no known trauma)    A 51 y/o male presents to the clinic with a hx of carpal tunnel syndrome, chronic pain syndrome, anxiety and depression, HLD and DM type 2 presents to the clinic with c/o sudden left knee swelling, pain, and warmth to the knee x 4 days that has worsened over the last two days. He denies any known trauma or injury, he denies fever, body aches, chills, n/v/d, abdominal discomfort, calf pain or swelling, shortness of breath or chest pain.       Constitution: Negative for chills and fever.   HENT: Negative.     Neck: neck negative.   Eyes: Negative.    Respiratory: Negative.     Gastrointestinal: Negative.    Genitourinary: Negative.    Musculoskeletal:  Positive for pain, joint pain and joint swelling. Negative for trauma.   Skin:  Negative for erythema.    Objective:     Physical Exam   Constitutional: He is oriented to person, place, and time. He appears well-developed. He is cooperative.  Non-toxic appearance. He does not appear ill. No distress.   HENT:   Head: Normocephalic and atraumatic.   Ears:   Right Ear: Hearing and external ear normal.   Left Ear: Hearing and external ear normal.   Mouth/Throat: Mucous membranes are normal. Mucous membranes are moist. Oropharynx is clear.   Eyes: Conjunctivae and lids are normal.   Neck: Trachea normal and phonation normal. Neck supple. No edema present. No erythema present. No neck rigidity present.   Cardiovascular: Normal rate, regular rhythm and normal heart sounds.   Pulmonary/Chest: Effort normal. He has no decreased breath sounds.   Abdominal: Normal appearance.   Musculoskeletal: "      Right knee: Normal.      Left knee: He exhibits decreased range of motion, swelling and effusion (warmth noted upon palpation, 1+ pitting edema). He exhibits no laceration, no erythema, no bony tenderness and no MCL laxity. Tenderness found. Medial joint line and lateral joint line tenderness noted.      Comments: Limping noted with ambulation  NVS intact, 2+ distal pulses, sensation intact   Skin is warm to the touch compared to the right extremity and 1+ pitting edema is noted; no appreciated erythema  Active flexion of the left knee limited and painful   Neurological: He is alert and oriented to person, place, and time. He exhibits normal muscle tone.   Skin: Skin is warm, intact, not diaphoretic and no rash. Capillary refill takes less than 2 seconds. not left kneeNo erythema   Psychiatric: His speech is normal and behavior is normal. Mood normal.   Nursing note and vitals reviewed.    Assessment:     1. Knee pain, unspecified chronicity, unspecified laterality    2. Pain and swelling of knee, left        Plan:       Knee pain, unspecified chronicity, unspecified laterality  -     XR KNEE 3 VIEW LEFT    Pain and swelling of knee, left    No history of trauma or injury, no evidence of arthritis on xray and sudden onset of swelling, pain and warmth to the extremity concern for infection vs inflammation, advised to go to the ortho ER for possible joint aspiration and further treatment. Patient agreed with plan will go POV to [facility].

## 2023-07-06 ENCOUNTER — OFFICE VISIT (OUTPATIENT)
Dept: ORTHOPEDICS | Facility: CLINIC | Age: 53
End: 2023-07-06
Payer: MEDICARE

## 2023-07-06 VITALS
HEIGHT: 67 IN | HEART RATE: 98 BPM | BODY MASS INDEX: 27.31 KG/M2 | TEMPERATURE: 98 F | DIASTOLIC BLOOD PRESSURE: 81 MMHG | SYSTOLIC BLOOD PRESSURE: 127 MMHG | WEIGHT: 174 LBS

## 2023-07-06 DIAGNOSIS — L03.116 CELLULITIS OF LEFT LOWER LEG: Primary | ICD-10-CM

## 2023-07-06 PROCEDURE — 1159F PR MEDICATION LIST DOCUMENTED IN MEDICAL RECORD: ICD-10-PCS | Mod: CPTII,,, | Performed by: ORTHOPAEDIC SURGERY

## 2023-07-06 PROCEDURE — 3008F PR BODY MASS INDEX (BMI) DOCUMENTED: ICD-10-PCS | Mod: CPTII,,, | Performed by: ORTHOPAEDIC SURGERY

## 2023-07-06 PROCEDURE — 3066F PR DOCUMENTATION OF TREATMENT FOR NEPHROPATHY: ICD-10-PCS | Mod: CPTII,,, | Performed by: ORTHOPAEDIC SURGERY

## 2023-07-06 PROCEDURE — 3066F NEPHROPATHY DOC TX: CPT | Mod: CPTII,,, | Performed by: ORTHOPAEDIC SURGERY

## 2023-07-06 PROCEDURE — 99214 PR OFFICE/OUTPT VISIT, EST, LEVL IV, 30-39 MIN: ICD-10-PCS | Mod: ,,, | Performed by: ORTHOPAEDIC SURGERY

## 2023-07-06 PROCEDURE — 1159F MED LIST DOCD IN RCRD: CPT | Mod: CPTII,,, | Performed by: ORTHOPAEDIC SURGERY

## 2023-07-06 PROCEDURE — 3008F BODY MASS INDEX DOCD: CPT | Mod: CPTII,,, | Performed by: ORTHOPAEDIC SURGERY

## 2023-07-06 PROCEDURE — 3061F NEG MICROALBUMINURIA REV: CPT | Mod: CPTII,,, | Performed by: ORTHOPAEDIC SURGERY

## 2023-07-06 PROCEDURE — 1160F RVW MEDS BY RX/DR IN RCRD: CPT | Mod: CPTII,,, | Performed by: ORTHOPAEDIC SURGERY

## 2023-07-06 PROCEDURE — 3074F PR MOST RECENT SYSTOLIC BLOOD PRESSURE < 130 MM HG: ICD-10-PCS | Mod: CPTII,,, | Performed by: ORTHOPAEDIC SURGERY

## 2023-07-06 PROCEDURE — 3061F PR NEG MICROALBUMINURIA RESULT DOCUMENTED/REVIEW: ICD-10-PCS | Mod: CPTII,,, | Performed by: ORTHOPAEDIC SURGERY

## 2023-07-06 PROCEDURE — 3074F SYST BP LT 130 MM HG: CPT | Mod: CPTII,,, | Performed by: ORTHOPAEDIC SURGERY

## 2023-07-06 PROCEDURE — 99214 OFFICE O/P EST MOD 30 MIN: CPT | Mod: ,,, | Performed by: ORTHOPAEDIC SURGERY

## 2023-07-06 PROCEDURE — 1160F PR REVIEW ALL MEDS BY PRESCRIBER/CLIN PHARMACIST DOCUMENTED: ICD-10-PCS | Mod: CPTII,,, | Performed by: ORTHOPAEDIC SURGERY

## 2023-07-06 PROCEDURE — 3079F PR MOST RECENT DIASTOLIC BLOOD PRESSURE 80-89 MM HG: ICD-10-PCS | Mod: CPTII,,, | Performed by: ORTHOPAEDIC SURGERY

## 2023-07-06 PROCEDURE — 3079F DIAST BP 80-89 MM HG: CPT | Mod: CPTII,,, | Performed by: ORTHOPAEDIC SURGERY

## 2023-07-06 RX ORDER — METHYLPREDNISOLONE 4 MG/1
TABLET ORAL
Qty: 21 EACH | Refills: 0 | Status: SHIPPED | OUTPATIENT
Start: 2023-07-06 | End: 2023-07-27

## 2023-07-06 RX ORDER — DOXYCYCLINE HYCLATE 100 MG
100 TABLET ORAL 2 TIMES DAILY
Qty: 20 TABLET | Refills: 0 | Status: SHIPPED | OUTPATIENT
Start: 2023-07-06 | End: 2023-07-16

## 2023-07-06 NOTE — PROGRESS NOTES
Orthopaedic Clinic  Orthopedic Clinic Note      Chief Complaint:   Chief Complaint   Patient presents with    Knee Pain     pt presents today with left knee pain and swelling. states that the pain and swelling started around thursday and has kept getting worse since. had appt 7/5/23 with Radha (xray and labs in chart). tried aspiration but no relief. is requesting MRI to be reordered      Referring Physician: No ref. provider found      History of Present Illness:    This is a 52 y.o. year old male presenting with complaints of left knee pain since 06/30/2023.  He reports that he noticed the pain spontaneously and does not recall any specific injury or trauma in the knee.  Pain is localized to the inferior aspect of the knee over lying the medial and lateral tibial plateau.  He also notes tenderness over the anteroinferior aspect of the knee, particularly over the tibial tubercle.  There is associated swelling, erythema, and pain extending throughout the lower extremity.  He reports a low-grade fever yesterday.  He was evaluated yesterday by a nurse practitioner in my office who ordered ESR and CRP, MRI of the left knee, and attempted aspiration.  ESR and CRP were slightly elevated and aspiration unsuccessful.  He was unable to have the MRI done secondary to a spinal cord stimulator.  The spinal cord stimulator representative was not in town at the time.      Past Medical History:   Diagnosis Date    Cervical radiculopathy     GERD (gastroesophageal reflux disease)     Leukopenia     Neurogenic bladder     Post laminectomy syndrome     Sleep disturbance     Snoring        Past Surgical History:   Procedure Laterality Date    APPENDECTOMY      CARPAL TUNNEL RELEASE      COLONOSCOPY  12/14/2015    SPINAL CORD STIMULATOR IMPLANT      TRIAL OF SPINAL CORD NERVE STIMULATOR         Current Outpatient Medications   Medication Sig    aspirin (ECOTRIN) 81 MG EC tablet Take 81 mg by mouth once daily.    atorvastatin  (LIPITOR) 80 MG tablet Take 1 tablet (80 mg total) by mouth once daily.    clonazePAM (KLONOPIN) 0.5 MG tablet Take 0.5 mg by mouth 2 (two) times daily.    docusate sodium (COLACE ORAL) Take by mouth.    fenofibrate 160 MG Tab Take 1 tablet (160 mg total) by mouth once daily.    iron,carb/vit C/vit B12/folic (IRON 100 PLUS ORAL) Take by mouth.    mecobalamin (B12 ACTIVE ORAL) Take by mouth.    melatonin 10 mg Tab Take by mouth.    metFORMIN (GLUCOPHAGE) 500 MG tablet Take 1 tablet (500 mg total) by mouth daily with breakfast.    multivit-minerals/FA/lycopene (ONE-A-DAY MEN'S ORAL) Take by mouth.    prednisoLONE acetate (PRED FORTE) 1 % DrpS Place into both eyes.    solifenacin (VESICARE) 10 MG tablet Take 10 mg by mouth once daily.    terazosin (HYTRIN) 5 MG capsule Take 1 capsule (5 mg total) by mouth every evening.    traMADoL (ULTRAM) 50 mg tablet Take 1 tablet (50 mg total) by mouth every 8 (eight) hours as needed for Pain.    traZODone (DESYREL) 150 MG tablet nightly.    venlafaxine (EFFEXOR-XR) 150 MG Cp24 Take 150 mg by mouth 2 (two) times a day.    doxycycline (VIBRA-TABS) 100 MG tablet Take 1 tablet (100 mg total) by mouth 2 (two) times daily. for 10 days    methylPREDNISolone (MEDROL DOSEPACK) 4 mg tablet use as directed     No current facility-administered medications for this visit.       Review of patient's allergies indicates:  No Known Allergies    Family History   Problem Relation Age of Onset    Heart disease Mother     Diabetes Mother     Hypertension Mother     Diabetes Father     Heart failure Father     Depression Sister        Social History     Socioeconomic History    Marital status:    Tobacco Use    Smoking status: Never    Smokeless tobacco: Never   Substance and Sexual Activity    Alcohol use: Not Currently    Drug use: Never    Sexual activity: Yes           Review of Systems:  All review of systems negative except for those stated in the HPI.    Examination:    Vital Signs:   "  Vitals:    07/06/23 1450   BP: 127/81   Pulse: 98   Temp: 98.1 °F (36.7 °C)   Weight: 78.9 kg (174 lb)   Height: 5' 7" (1.702 m)       Body mass index is 27.25 kg/m².    Physical Examination:  General: Well-developed, well-nourished.  Neuro: Alert and oriented x 3.  Psych: Normal mood and affect.  Card: Regular rate and rhythm  Resp: Respirations regular and unlabored  Left lower extremity exam:  Patient with cellulitic leg from the knee down to the ankle also with swelling from the knee to the ankle.  He has swollen but compressible compartments without any pain.      Imaging:  Prior three views of the left knee with no acute osseous pathology.      Assessment: Cellulitis of left lower leg  -     methylPREDNISolone (MEDROL DOSEPACK) 4 mg tablet; use as directed  Dispense: 21 each; Refill: 0  -     doxycycline (VIBRA-TABS) 100 MG tablet; Take 1 tablet (100 mg total) by mouth 2 (two) times daily. for 10 days  Dispense: 20 tablet; Refill: 0        Plan:  This is a very strange presentation.  In the presence of his fever an elevated ESR/CRP, prescription routed for doxycycline to be taken twice daily for 10 days.  I will also place him on a Medrol Dosepak to assist with inflammation.  Order entered for MRI of the left lower extremity to further evaluate soft tissue structures.  He can continue to weightbear as tolerated to the left lower extremity.  Continue over-the-counter medications as needed for pain.  He will return to clinic for review of MRI results once imaging is obtained.  He verbalized understanding of the plan of care with no further questions.    Mao Sánchez MD personally performed the services described in this documentation, including but not limited to patient's history, physical examination, and assessment and plan of care. All medical record entries made by LENNOX Seth were performed at his direction and in his presence. The medical record was reviewed and is accurate and complete.     "     Follow up for Review of MRI results.      DISCLAIMER: This note may have been dictated using voice recognition software and may contain grammatical errors.     NOTE: Consult report sent to referring provider via [employer] EMR.

## 2023-07-07 ENCOUNTER — TELEPHONE (OUTPATIENT)
Dept: ORTHOPEDICS | Facility: CLINIC | Age: 53
End: 2023-07-07
Payer: MEDICARE

## 2023-07-07 NOTE — TELEPHONE ENCOUNTER
Spoke to patient and let him know to call [facility] for his rescheduled MRI. ARTEMIO Salas also spoke to patient regarding tylenol/ibuprofen. See previous note in encounter.

## 2023-07-07 NOTE — TELEPHONE ENCOUNTER
----- Message from DOMINIK Andres sent at 7/7/2023  9:46 AM CDT -----  Please call patient and let him know that he should take Tylenol or ibuprofen prior to going to his MRI appointment if he is running any kind of temperature.  Temperature needs to be less than 100° F to proceed.  I am hopeful that the doxycycline will have begun to work and that he will not be running attempt by the time he is scheduled for MRI on 07/13/2023.

## 2023-07-13 ENCOUNTER — OFFICE VISIT (OUTPATIENT)
Dept: ORTHOPEDICS | Facility: CLINIC | Age: 53
End: 2023-07-13
Payer: MEDICARE

## 2023-07-13 VITALS — WEIGHT: 174 LBS | HEIGHT: 67 IN | BODY MASS INDEX: 27.31 KG/M2

## 2023-07-13 DIAGNOSIS — M25.462 SWELLING OF JOINT, KNEE, LEFT: ICD-10-CM

## 2023-07-13 DIAGNOSIS — L03.116 CELLULITIS OF LEFT LOWER LEG: ICD-10-CM

## 2023-07-13 DIAGNOSIS — M70.42 PREPATELLAR BURSITIS OF LEFT KNEE: Primary | ICD-10-CM

## 2023-07-13 PROCEDURE — 3066F NEPHROPATHY DOC TX: CPT | Mod: CPTII,,, | Performed by: ORTHOPAEDIC SURGERY

## 2023-07-13 PROCEDURE — 99213 PR OFFICE/OUTPT VISIT, EST, LEVL III, 20-29 MIN: ICD-10-PCS | Mod: ,,, | Performed by: ORTHOPAEDIC SURGERY

## 2023-07-13 PROCEDURE — 1159F PR MEDICATION LIST DOCUMENTED IN MEDICAL RECORD: ICD-10-PCS | Mod: CPTII,,, | Performed by: ORTHOPAEDIC SURGERY

## 2023-07-13 PROCEDURE — 3061F NEG MICROALBUMINURIA REV: CPT | Mod: CPTII,,, | Performed by: ORTHOPAEDIC SURGERY

## 2023-07-13 PROCEDURE — 3044F PR MOST RECENT HEMOGLOBIN A1C LEVEL <7.0%: ICD-10-PCS | Mod: CPTII,,, | Performed by: ORTHOPAEDIC SURGERY

## 2023-07-13 PROCEDURE — 3044F HG A1C LEVEL LT 7.0%: CPT | Mod: CPTII,,, | Performed by: ORTHOPAEDIC SURGERY

## 2023-07-13 PROCEDURE — 3066F PR DOCUMENTATION OF TREATMENT FOR NEPHROPATHY: ICD-10-PCS | Mod: CPTII,,, | Performed by: ORTHOPAEDIC SURGERY

## 2023-07-13 PROCEDURE — 1160F PR REVIEW ALL MEDS BY PRESCRIBER/CLIN PHARMACIST DOCUMENTED: ICD-10-PCS | Mod: CPTII,,, | Performed by: ORTHOPAEDIC SURGERY

## 2023-07-13 PROCEDURE — 1159F MED LIST DOCD IN RCRD: CPT | Mod: CPTII,,, | Performed by: ORTHOPAEDIC SURGERY

## 2023-07-13 PROCEDURE — 3061F PR NEG MICROALBUMINURIA RESULT DOCUMENTED/REVIEW: ICD-10-PCS | Mod: CPTII,,, | Performed by: ORTHOPAEDIC SURGERY

## 2023-07-13 PROCEDURE — 3008F BODY MASS INDEX DOCD: CPT | Mod: CPTII,,, | Performed by: ORTHOPAEDIC SURGERY

## 2023-07-13 PROCEDURE — 3008F PR BODY MASS INDEX (BMI) DOCUMENTED: ICD-10-PCS | Mod: CPTII,,, | Performed by: ORTHOPAEDIC SURGERY

## 2023-07-13 PROCEDURE — 99213 OFFICE O/P EST LOW 20 MIN: CPT | Mod: ,,, | Performed by: ORTHOPAEDIC SURGERY

## 2023-07-13 PROCEDURE — 1160F RVW MEDS BY RX/DR IN RCRD: CPT | Mod: CPTII,,, | Performed by: ORTHOPAEDIC SURGERY

## 2023-07-13 NOTE — PROGRESS NOTES
Orthopaedic Clinic  Orthopedic Clinic Note      Chief Complaint:   Chief Complaint   Patient presents with    Left Knee - Pain    Knee Pain     f/u for left knee pain, did not get MRI done but wondering about CT scan or ultrasound because he has a know on patellar tendon that is painful to touch, pain all around is doing better     Referring Physician: No ref. provider found      History of Present Illness:    This is a 53 y.o. year old male presenting with complaints of left knee pain since 06/30/2023.  He reports that he noticed the pain spontaneously and does not recall any specific injury or trauma in the knee.  Pain is localized to the inferior aspect of the knee over lying the medial and lateral tibial plateau.  He also notes tenderness over the anteroinferior aspect of the knee, particularly over the tibial tubercle.  There is associated swelling, erythema, and pain extending throughout the lower extremity.  He reports a low-grade fever yesterday.  He was evaluated yesterday by a nurse practitioner in my office who ordered ESR and CRP, MRI of the left knee, and attempted aspiration.  ESR and CRP were slightly elevated and aspiration unsuccessful.  He was unable to have the MRI done secondary to a spinal cord stimulator.  The spinal cord stimulator representative was not in town at the time.  07/13/2023 Patient presents for follow-up on left lower extremity pain.  He was unable to complete the MRI secondary to his spinal cord stimulator.  However, he reports great improvement in his symptoms since prior visit.  He has been compliant with the corticosteroids and antibiotics.  He states that his swelling has improved dramatically.  His lower leg is no longer tender to palpation.  His prepatellar bursa is the only area of his knee but is still somewhat symptomatic.      Past Medical History:   Diagnosis Date    Cervical radiculopathy     GERD (gastroesophageal reflux disease)     Leukopenia     Neurogenic  bladder     Post laminectomy syndrome     Sleep disturbance     Snoring        Past Surgical History:   Procedure Laterality Date    APPENDECTOMY      CARPAL TUNNEL RELEASE      COLONOSCOPY  12/14/2015    SPINAL CORD STIMULATOR IMPLANT      TRIAL OF SPINAL CORD NERVE STIMULATOR         Current Outpatient Medications   Medication Sig    aspirin (ECOTRIN) 81 MG EC tablet Take 81 mg by mouth once daily.    atorvastatin (LIPITOR) 80 MG tablet Take 1 tablet (80 mg total) by mouth once daily.    clonazePAM (KLONOPIN) 0.5 MG tablet Take 0.5 mg by mouth 2 (two) times daily.    docusate sodium (COLACE ORAL) Take by mouth.    doxycycline (VIBRA-TABS) 100 MG tablet Take 1 tablet (100 mg total) by mouth 2 (two) times daily. for 10 days    fenofibrate 160 MG Tab Take 1 tablet (160 mg total) by mouth once daily.    iron,carb/vit C/vit B12/folic (IRON 100 PLUS ORAL) Take by mouth.    mecobalamin (B12 ACTIVE ORAL) Take by mouth.    melatonin 10 mg Tab Take by mouth.    metFORMIN (GLUCOPHAGE) 500 MG tablet Take 1 tablet (500 mg total) by mouth daily with breakfast.    methylPREDNISolone (MEDROL DOSEPACK) 4 mg tablet use as directed    multivit-minerals/FA/lycopene (ONE-A-DAY MEN'S ORAL) Take by mouth.    prednisoLONE acetate (PRED FORTE) 1 % DrpS Place into both eyes.    solifenacin (VESICARE) 10 MG tablet Take 10 mg by mouth once daily.    terazosin (HYTRIN) 5 MG capsule Take 1 capsule (5 mg total) by mouth every evening.    traMADoL (ULTRAM) 50 mg tablet Take 1 tablet (50 mg total) by mouth every 8 (eight) hours as needed for Pain.    traZODone (DESYREL) 150 MG tablet nightly.    venlafaxine (EFFEXOR-XR) 150 MG Cp24 Take 150 mg by mouth 2 (two) times a day.     No current facility-administered medications for this visit.       Review of patient's allergies indicates:  No Known Allergies    Family History   Problem Relation Age of Onset    Heart disease Mother     Diabetes Mother     Hypertension Mother     Diabetes Father      "Heart failure Father     Depression Sister        Social History     Socioeconomic History    Marital status:    Tobacco Use    Smoking status: Never    Smokeless tobacco: Never   Substance and Sexual Activity    Alcohol use: Not Currently    Drug use: Never    Sexual activity: Yes           Review of Systems:  All review of systems negative except for those stated in the HPI.    Examination:    Vital Signs:    Vitals:    07/13/23 1548 07/13/23 1549   Weight: 78.9 kg (174 lb)    Height: 5' 7" (1.702 m)    PainSc:    2       Body mass index is 27.25 kg/m².    Physical Examination:  General: Well-developed, well-nourished.  Neuro: Alert and oriented x 3.  Psych: Normal mood and affect.  Card: Regular rate and rhythm  Resp: Respirations regular and unlabored  Left lower extremity exam: no deformity, resolution of erythema and swelling to the lower leg.  Mild but improved swelling and tenderness to palpation over the prepatellar bursa.  There is no evidence of infection of the prepatellar bursa.  No pain with range of motion of all joints, NVI distally, sensibility normal.      Imaging:  Prior three views of the left knee with no acute osseous pathology.      Assessment: Prepatellar bursitis of left knee    Cellulitis of left lower leg    Swelling of joint, knee, left        Plan:  I think that this cellulitic infection of the left lower extremity originally stemmed from a infected prepatellar bursa.  This is the only area that remains somewhat symptomatic.  He will continue his previously prescribed antibiotics to completion.  He can continue to weightbear as tolerated to the left lower extremity.  Continue over-the-counter medications as needed for pain.  Advised that he return to clinic if his symptoms should worsen in any way.  If his symptoms continue to improve, follow-up as needed for any additional issues or concerns.  He verbalized understanding of the plan of care with no further questions.    Mao " MD Gonzalo personally performed the services described in this documentation, including but not limited to patient's history, physical examination, and assessment and plan of care. All medical record entries made by LENNOX Seth were performed at his direction and in his presence. The medical record was reviewed and is accurate and complete.         Follow up if symptoms worsen or fail to improve.      DISCLAIMER: This note may have been dictated using voice recognition software and may contain grammatical errors.     NOTE: Consult report sent to referring provider via [employer] EMR.

## 2023-09-18 PROBLEM — Z00.00 MEDICARE ANNUAL WELLNESS VISIT, SUBSEQUENT: Status: RESOLVED | Noted: 2022-06-13 | Resolved: 2023-09-18

## 2023-09-28 ENCOUNTER — TELEPHONE (OUTPATIENT)
Dept: FAMILY MEDICINE | Facility: CLINIC | Age: 53
End: 2023-09-28
Payer: MEDICARE

## 2023-09-28 NOTE — TELEPHONE ENCOUNTER
----- Message from Santo Izaguirre sent at 9/28/2023 10:52 AM CDT -----  .Type:  Patient Returning Call    Who Called:pt   Does the patient know what this is regarding?:pharmacy change   Would the patient rather a call back or a response via [facility]? Call back   Best Call Back Number:[phone]  Additional Information: Pt states that he is switching pharmacy immediately. Please send the prescriptions to [facility] in  [place]

## 2024-02-15 ENCOUNTER — LAB VISIT (OUTPATIENT)
Dept: LAB | Facility: HOSPITAL | Age: 54
End: 2024-02-15
Attending: INTERNAL MEDICINE
Payer: MEDICARE

## 2024-02-15 DIAGNOSIS — D64.9 NORMOCYTIC ANEMIA: ICD-10-CM

## 2024-02-15 DIAGNOSIS — D72.819 LEUKOPENIA, UNSPECIFIED TYPE: ICD-10-CM

## 2024-02-15 LAB
ALBUMIN SERPL-MCNC: 4.4 G/DL (ref 3.5–5)
ALBUMIN/GLOB SERPL: 1.8 RATIO (ref 1.1–2)
ALP SERPL-CCNC: 62 UNIT/L (ref 40–150)
ALT SERPL-CCNC: 30 UNIT/L (ref 0–55)
AST SERPL-CCNC: 33 UNIT/L (ref 5–34)
BASOPHILS # BLD AUTO: 0.02 X10(3)/MCL
BASOPHILS NFR BLD AUTO: 0.6 %
BILIRUB SERPL-MCNC: 0.3 MG/DL
BUN SERPL-MCNC: 23.4 MG/DL (ref 8.4–25.7)
CALCIUM SERPL-MCNC: 9.8 MG/DL (ref 8.4–10.2)
CHLORIDE SERPL-SCNC: 106 MMOL/L (ref 98–107)
CO2 SERPL-SCNC: 26 MMOL/L (ref 22–29)
CREAT SERPL-MCNC: 1.02 MG/DL (ref 0.73–1.18)
EOSINOPHIL # BLD AUTO: 0.04 X10(3)/MCL (ref 0–0.9)
EOSINOPHIL NFR BLD AUTO: 1.2 %
ERYTHROCYTE [DISTWIDTH] IN BLOOD BY AUTOMATED COUNT: 12.2 % (ref 11.5–17)
GFR SERPLBLD CREATININE-BSD FMLA CKD-EPI: >60 MLS/MIN/1.73/M2
GLOBULIN SER-MCNC: 2.4 GM/DL (ref 2.4–3.5)
GLUCOSE SERPL-MCNC: 100 MG/DL (ref 74–100)
HCT VFR BLD AUTO: 40.5 % (ref 42–52)
HGB BLD-MCNC: 13.5 G/DL (ref 14–18)
IMM GRANULOCYTES # BLD AUTO: 0 X10(3)/MCL (ref 0–0.04)
IMM GRANULOCYTES NFR BLD AUTO: 0 %
LYMPHOCYTES # BLD AUTO: 1.49 X10(3)/MCL (ref 0.6–4.6)
LYMPHOCYTES NFR BLD AUTO: 46.1 %
MCH RBC QN AUTO: 30.8 PG (ref 27–31)
MCHC RBC AUTO-ENTMCNC: 33.3 G/DL (ref 33–36)
MCV RBC AUTO: 92.5 FL (ref 80–94)
MONOCYTES # BLD AUTO: 0.33 X10(3)/MCL (ref 0.1–1.3)
MONOCYTES NFR BLD AUTO: 10.2 %
NEUTROPHILS # BLD AUTO: 1.35 X10(3)/MCL (ref 2.1–9.2)
NEUTROPHILS NFR BLD AUTO: 41.9 %
PLATELET # BLD AUTO: 203 X10(3)/MCL (ref 130–400)
PMV BLD AUTO: 8.7 FL (ref 7.4–10.4)
POTASSIUM SERPL-SCNC: 4.2 MMOL/L (ref 3.5–5.1)
PROT SERPL-MCNC: 6.8 GM/DL (ref 6.4–8.3)
RBC # BLD AUTO: 4.38 X10(6)/MCL (ref 4.7–6.1)
SODIUM SERPL-SCNC: 140 MMOL/L (ref 136–145)
WBC # SPEC AUTO: 3.23 X10(3)/MCL (ref 4.5–11.5)

## 2024-02-15 PROCEDURE — 80053 COMPREHEN METABOLIC PANEL: CPT

## 2024-02-15 PROCEDURE — 85025 COMPLETE CBC W/AUTO DIFF WBC: CPT

## 2024-02-15 PROCEDURE — 36415 COLL VENOUS BLD VENIPUNCTURE: CPT

## 2024-02-23 ENCOUNTER — OFFICE VISIT (OUTPATIENT)
Dept: HEMATOLOGY/ONCOLOGY | Facility: CLINIC | Age: 54
End: 2024-02-23
Payer: MEDICARE

## 2024-02-23 VITALS
SYSTOLIC BLOOD PRESSURE: 129 MMHG | OXYGEN SATURATION: 98 % | HEART RATE: 90 BPM | WEIGHT: 184.44 LBS | BODY MASS INDEX: 28.95 KG/M2 | DIASTOLIC BLOOD PRESSURE: 82 MMHG | TEMPERATURE: 98 F | RESPIRATION RATE: 14 BRPM | HEIGHT: 67 IN

## 2024-02-23 DIAGNOSIS — D70.4 CYCLICAL NEUTROPENIA: Primary | ICD-10-CM

## 2024-02-23 DIAGNOSIS — D64.9 NORMOCYTIC ANEMIA: ICD-10-CM

## 2024-02-23 PROCEDURE — 3074F SYST BP LT 130 MM HG: CPT | Mod: CPTII,S$GLB,, | Performed by: NURSE PRACTITIONER

## 2024-02-23 PROCEDURE — 99213 OFFICE O/P EST LOW 20 MIN: CPT | Mod: S$GLB,,, | Performed by: NURSE PRACTITIONER

## 2024-02-23 PROCEDURE — 1159F MED LIST DOCD IN RCRD: CPT | Mod: CPTII,S$GLB,, | Performed by: NURSE PRACTITIONER

## 2024-02-23 PROCEDURE — 3008F BODY MASS INDEX DOCD: CPT | Mod: CPTII,S$GLB,, | Performed by: NURSE PRACTITIONER

## 2024-02-23 PROCEDURE — 99999 PR PBB SHADOW E&M-EST. PATIENT-LVL IV: CPT | Mod: PBBFAC,,, | Performed by: NURSE PRACTITIONER

## 2024-02-23 PROCEDURE — 3079F DIAST BP 80-89 MM HG: CPT | Mod: CPTII,S$GLB,, | Performed by: NURSE PRACTITIONER

## 2024-02-23 PROCEDURE — 1160F RVW MEDS BY RX/DR IN RCRD: CPT | Mod: CPTII,S$GLB,, | Performed by: NURSE PRACTITIONER

## 2024-02-23 NOTE — PROGRESS NOTES
Subjective:       Patient ID: Lamine Preciado is a 53 y.o. male.    Chief Complaint: Fatigue      Diagnosis:  1. Leukopenia   2. Normocytic anemia     Current Treatment:   OTC Iron once/day.     Treatment History:  Not applicable    HPI:  Patient kindly referred by Dr. Gonzales for further evaluation of leukopenia along with mild/borderline normocytic anemia. The patient began developing signs of anemia and leukopenia in early 2016 with CBC in April of that year showed WBC of 2.6 and hemoglobin of 11.9, with relative decrease in his neutrophils.  He was followed periodically after that and had a white count that went up and down, never completely normalizing. His H&H improved during that time and additional testing for anemia such as iron studies B12 folate and haptoglobin all came back unremarkable.  The patient had labs done with Dr. Gonzales on November 2, 2017 which demonstrated mildly decreased hemoglobin of 12.4 WBC of 3.3, prompting referral to hematology for further workup.  Clinically, the patient presented at the time of his initial evaluation with us with increasing fatigue of approximately 3-6 months duration without any associated night sweats or unintentional weight loss, or any fevers chills or chronic infections.  Labs done December 7, 2017 show normal iron studies with serum iron of 106, TIBC 399, ferritin of 60.5. B12 and folate normal at 1630.9 respectively. Haptoglobin normal at 91. Serum protein electrophoresis and immunofixation unremarkable/negative.  Peripheral smear with nonspecific findings showing mild normochromic normocytic anemia with no schistocytes and slight increase in reactive lymphocytes with normal platelet number and morphology.  Reticulocyte count normal at 2.0. Mia antibody testing negative.  Additional lab tests including HIV and hepatitis panel all negative. Rheumatoid factor normal at 10. SAMUEL negative with double-stranded DNA less than 1.  Ultrasound of the abdomen done  December 15, 2017 shows normal size of the spleen and liver with fatty liver only.  BM Bx done 12/22/2017 noted mild normocytic, normochromic anemia, mild neutropenia. Normocellular marrow with trilineage hematopoiesis w/o specific abnormalities noted.  Patient's labs have essentially been stable since that time.            Interval History:   Patient here for scheduled follow up for leukopenia and anemia.   The patient continues to do well from a hematological standpoint.  He denies any issues with frequent infections or illness.  He did have a bout with epididymitis in the past year which has now resolved.  He denies any issues with fevers, night sweats, bleeding, unintentional weight loss.  His energy level does fluctuate.      Past Medical History:   Diagnosis Date    Cervical radiculopathy     GERD (gastroesophageal reflux disease)     Leukopenia     Neurogenic bladder     Post laminectomy syndrome     Sleep disturbance     Snoring       Past Surgical History:   Procedure Laterality Date    APPENDECTOMY      CARPAL TUNNEL RELEASE      COLONOSCOPY  12/14/2015    SPINAL CORD STIMULATOR IMPLANT      TRIAL OF SPINAL CORD NERVE STIMULATOR       Social History     Socioeconomic History    Marital status:    Tobacco Use    Smoking status: Never    Smokeless tobacco: Never   Substance and Sexual Activity    Alcohol use: Not Currently    Drug use: Never    Sexual activity: Yes      Family History   Problem Relation Age of Onset    Heart disease Mother     Diabetes Mother     Hypertension Mother     Diabetes Father     Heart failure Father     Depression Sister       Review of patient's allergies indicates:  No Known Allergies   Review of Systems   Constitutional:  Negative for chills, diaphoresis, fatigue, fever and unexpected weight change.   HENT:  Negative for nasal congestion, mouth sores, sinus pressure/congestion and sore throat.    Eyes:  Negative for pain and visual disturbance.   Respiratory:  Negative  for cough, chest tightness and shortness of breath.    Cardiovascular:  Negative for chest pain, palpitations and leg swelling.   Gastrointestinal:  Negative for abdominal distention, abdominal pain, blood in stool, constipation and diarrhea.   Genitourinary:  Negative for dysuria, frequency and hematuria.   Musculoskeletal:  Negative for arthralgias and back pain.   Integumentary:  Negative for rash.   Neurological:  Negative for dizziness, weakness, numbness and headaches.   Hematological:  Negative for adenopathy.   Psychiatric/Behavioral:  Negative for confusion.          Objective:      Physical Exam  Vitals reviewed.   Constitutional:       General: He is awake.      Appearance: Normal appearance.   HENT:      Head: Normocephalic and atraumatic.      Right Ear: Hearing normal.      Left Ear: Hearing normal.      Nose: Nose normal.   Eyes:      General: Lids are normal. Vision grossly intact.      Extraocular Movements: Extraocular movements intact.      Conjunctiva/sclera: Conjunctivae normal.   Cardiovascular:      Rate and Rhythm: Normal rate and regular rhythm.      Pulses: Normal pulses.      Heart sounds: Normal heart sounds.   Pulmonary:      Effort: Pulmonary effort is normal.      Breath sounds: Normal breath sounds. No wheezing, rhonchi or rales.   Abdominal:      General: Bowel sounds are normal.      Palpations: Abdomen is soft.      Tenderness: There is no abdominal tenderness.   Musculoskeletal:      Cervical back: Full passive range of motion without pain.      Right lower leg: No edema.      Left lower leg: No edema.   Lymphadenopathy:      Cervical: No cervical adenopathy.      Upper Body:      Right upper body: No supraclavicular or axillary adenopathy.      Left upper body: No supraclavicular or axillary adenopathy.   Skin:     General: Skin is warm.   Neurological:      General: No focal deficit present.      Mental Status: He is alert and oriented to person, place, and time.   Psychiatric:          Attention and Perception: Attention normal.         Mood and Affect: Mood and affect normal.         Behavior: Behavior is cooperative.         LABS AND IMAGING REVIEWED IN EPIC          Assessment:     1. Leukopenia   2. Normocytic anemia      Plan:         Continue with p.o. iron supplementation.    Labs remain very stable.     We will see the patient back in 1 year with a CBC, CMP     I explained that we would only repeat a bone marrow biopsy if his labs changed significantly or if his hemoglobin/hematocrit or platelet count started to decrease.     He knows to call sooner if any new signs or symptoms occur.     He voiced understanding with all questions answered      Mindi Cobian, BREP-C

## 2024-06-11 ENCOUNTER — TELEPHONE (OUTPATIENT)
Dept: FAMILY MEDICINE | Facility: CLINIC | Age: 54
End: 2024-06-11
Payer: MEDICARE

## 2024-06-11 DIAGNOSIS — E11.9 TYPE 2 DIABETES MELLITUS WITHOUT COMPLICATION, WITHOUT LONG-TERM CURRENT USE OF INSULIN: ICD-10-CM

## 2024-06-11 DIAGNOSIS — M54.12 CERVICAL RADICULOPATHY: ICD-10-CM

## 2024-06-11 DIAGNOSIS — F32.A DEPRESSIVE DISORDER: ICD-10-CM

## 2024-06-11 DIAGNOSIS — G56.00 CARPAL TUNNEL SYNDROME, UNSPECIFIED LATERALITY: ICD-10-CM

## 2024-06-11 DIAGNOSIS — E78.5 DYSLIPIDEMIA: ICD-10-CM

## 2024-06-11 DIAGNOSIS — Z00.00 MEDICARE ANNUAL WELLNESS VISIT, SUBSEQUENT: Primary | ICD-10-CM

## 2024-06-11 DIAGNOSIS — F41.9 ANXIETY: ICD-10-CM

## 2024-06-11 DIAGNOSIS — Z71.89 ADVANCED CARE PLANNING/COUNSELING DISCUSSION: ICD-10-CM

## 2024-06-11 DIAGNOSIS — G89.4 CHRONIC PAIN SYNDROME: ICD-10-CM

## 2024-06-11 DIAGNOSIS — E11.69 HYPERLIPIDEMIA ASSOCIATED WITH TYPE 2 DIABETES MELLITUS: ICD-10-CM

## 2024-06-11 DIAGNOSIS — D72.819 LEUKOPENIA, UNSPECIFIED TYPE: ICD-10-CM

## 2024-06-11 DIAGNOSIS — E78.5 HYPERLIPIDEMIA ASSOCIATED WITH TYPE 2 DIABETES MELLITUS: ICD-10-CM

## 2024-06-17 ENCOUNTER — LAB VISIT (OUTPATIENT)
Dept: LAB | Facility: HOSPITAL | Age: 54
End: 2024-06-17
Attending: FAMILY MEDICINE
Payer: MEDICARE

## 2024-06-17 ENCOUNTER — OFFICE VISIT (OUTPATIENT)
Dept: FAMILY MEDICINE | Facility: CLINIC | Age: 54
End: 2024-06-17
Payer: MEDICARE

## 2024-06-17 VITALS
HEIGHT: 67 IN | SYSTOLIC BLOOD PRESSURE: 110 MMHG | BODY MASS INDEX: 27.04 KG/M2 | WEIGHT: 172.31 LBS | RESPIRATION RATE: 16 BRPM | DIASTOLIC BLOOD PRESSURE: 78 MMHG | HEART RATE: 73 BPM | OXYGEN SATURATION: 97 % | TEMPERATURE: 98 F

## 2024-06-17 DIAGNOSIS — N31.9 NEUROGENIC BLADDER: ICD-10-CM

## 2024-06-17 DIAGNOSIS — Z71.89 ADVANCED CARE PLANNING/COUNSELING DISCUSSION: ICD-10-CM

## 2024-06-17 DIAGNOSIS — G56.00 CARPAL TUNNEL SYNDROME, UNSPECIFIED LATERALITY: ICD-10-CM

## 2024-06-17 DIAGNOSIS — D70.4 CYCLICAL NEUTROPENIA: ICD-10-CM

## 2024-06-17 DIAGNOSIS — Z00.00 MEDICARE ANNUAL WELLNESS VISIT, SUBSEQUENT: ICD-10-CM

## 2024-06-17 DIAGNOSIS — D72.819 LEUKOPENIA, UNSPECIFIED TYPE: ICD-10-CM

## 2024-06-17 DIAGNOSIS — E11.9 TYPE 2 DIABETES MELLITUS WITHOUT COMPLICATION, WITHOUT LONG-TERM CURRENT USE OF INSULIN: ICD-10-CM

## 2024-06-17 DIAGNOSIS — L21.9 SEBORRHEIC DERMATITIS: ICD-10-CM

## 2024-06-17 DIAGNOSIS — Z00.00 MEDICARE ANNUAL WELLNESS VISIT, SUBSEQUENT: Primary | ICD-10-CM

## 2024-06-17 DIAGNOSIS — F33.2 MAJOR DEPRESSIVE DISORDER, RECURRENT SEVERE WITHOUT PSYCHOTIC FEATURES: ICD-10-CM

## 2024-06-17 DIAGNOSIS — F32.A DEPRESSIVE DISORDER: ICD-10-CM

## 2024-06-17 DIAGNOSIS — D64.9 NORMOCYTIC ANEMIA: ICD-10-CM

## 2024-06-17 DIAGNOSIS — M54.12 CERVICAL RADICULOPATHY: ICD-10-CM

## 2024-06-17 DIAGNOSIS — F41.9 ANXIETY: ICD-10-CM

## 2024-06-17 DIAGNOSIS — E78.5 DYSLIPIDEMIA: ICD-10-CM

## 2024-06-17 DIAGNOSIS — G89.4 CHRONIC PAIN SYNDROME: ICD-10-CM

## 2024-06-17 DIAGNOSIS — E78.5 HYPERLIPIDEMIA ASSOCIATED WITH TYPE 2 DIABETES MELLITUS: ICD-10-CM

## 2024-06-17 DIAGNOSIS — E11.69 HYPERLIPIDEMIA ASSOCIATED WITH TYPE 2 DIABETES MELLITUS: ICD-10-CM

## 2024-06-17 DIAGNOSIS — Z98.890 HISTORY OF BACK SURGERY: ICD-10-CM

## 2024-06-17 DIAGNOSIS — N40.0 BENIGN PROSTATIC HYPERPLASIA, UNSPECIFIED WHETHER LOWER URINARY TRACT SYMPTOMS PRESENT: ICD-10-CM

## 2024-06-17 LAB
ALBUMIN SERPL-MCNC: 4.6 G/DL (ref 3.5–5)
ALBUMIN/GLOB SERPL: 1.4 RATIO (ref 1.1–2)
ALP SERPL-CCNC: 67 UNIT/L (ref 40–150)
ALT SERPL-CCNC: 34 UNIT/L (ref 0–55)
ANION GAP SERPL CALC-SCNC: 9 MEQ/L
AST SERPL-CCNC: 26 UNIT/L (ref 5–34)
BACTERIA #/AREA URNS AUTO: ABNORMAL /HPF
BASOPHILS # BLD AUTO: 0.01 X10(3)/MCL
BASOPHILS NFR BLD AUTO: 0.3 %
BILIRUB SERPL-MCNC: 0.3 MG/DL
BILIRUB UR QL STRIP.AUTO: NEGATIVE
BUN SERPL-MCNC: 20 MG/DL (ref 8.4–25.7)
CALCIUM SERPL-MCNC: 10.1 MG/DL (ref 8.4–10.2)
CHLORIDE SERPL-SCNC: 106 MMOL/L (ref 98–107)
CHOLEST SERPL-MCNC: 122 MG/DL
CHOLEST/HDLC SERPL: 3 {RATIO} (ref 0–5)
CLARITY UR: ABNORMAL
CO2 SERPL-SCNC: 29 MMOL/L (ref 22–29)
COLOR UR AUTO: ABNORMAL
CREAT SERPL-MCNC: 0.86 MG/DL (ref 0.73–1.18)
CREAT UR-MCNC: 113.8 MG/DL (ref 63–166)
CREAT/UREA NIT SERPL: 23
EOSINOPHIL # BLD AUTO: 0.06 X10(3)/MCL (ref 0–0.9)
EOSINOPHIL NFR BLD AUTO: 1.5 %
ERYTHROCYTE [DISTWIDTH] IN BLOOD BY AUTOMATED COUNT: 12.8 % (ref 11.5–17)
EST. AVERAGE GLUCOSE BLD GHB EST-MCNC: 131.2 MG/DL
GFR SERPLBLD CREATININE-BSD FMLA CKD-EPI: >60 ML/MIN/1.73/M2
GLOBULIN SER-MCNC: 3.2 GM/DL (ref 2.4–3.5)
GLUCOSE SERPL-MCNC: 113 MG/DL (ref 74–100)
GLUCOSE UR QL STRIP: NEGATIVE
HBA1C MFR BLD: 6.2 %
HCT VFR BLD AUTO: 43.8 % (ref 42–52)
HDLC SERPL-MCNC: 43 MG/DL (ref 35–60)
HGB BLD-MCNC: 14.6 G/DL (ref 14–18)
HGB UR QL STRIP: ABNORMAL
IMM GRANULOCYTES # BLD AUTO: 0.01 X10(3)/MCL (ref 0–0.04)
IMM GRANULOCYTES NFR BLD AUTO: 0.3 %
KETONES UR QL STRIP: NEGATIVE
LDLC SERPL CALC-MCNC: 61 MG/DL (ref 50–140)
LEUKOCYTE ESTERASE UR QL STRIP: ABNORMAL
LYMPHOCYTES # BLD AUTO: 1.84 X10(3)/MCL (ref 0.6–4.6)
LYMPHOCYTES NFR BLD AUTO: 46.1 %
MCH RBC QN AUTO: 31.3 PG (ref 27–31)
MCHC RBC AUTO-ENTMCNC: 33.3 G/DL (ref 33–36)
MCV RBC AUTO: 93.8 FL (ref 80–94)
MICROALBUMIN UR-MCNC: 37.4 UG/ML
MICROALBUMIN/CREAT RATIO PNL UR: 32.9 MG/GM CR (ref 0–30)
MONOCYTES # BLD AUTO: 0.33 X10(3)/MCL (ref 0.1–1.3)
MONOCYTES NFR BLD AUTO: 8.3 %
NEUTROPHILS # BLD AUTO: 1.74 X10(3)/MCL (ref 2.1–9.2)
NEUTROPHILS NFR BLD AUTO: 43.5 %
NITRITE UR QL STRIP: NEGATIVE
NRBC BLD AUTO-RTO: 0 %
PH UR STRIP: 6 [PH]
PLATELET # BLD AUTO: 212 X10(3)/MCL (ref 130–400)
PMV BLD AUTO: 9.4 FL (ref 7.4–10.4)
POTASSIUM SERPL-SCNC: 4.7 MMOL/L (ref 3.5–5.1)
PROT SERPL-MCNC: 7.8 GM/DL (ref 6.4–8.3)
PROT UR QL STRIP: NEGATIVE
RBC # BLD AUTO: 4.67 X10(6)/MCL (ref 4.7–6.1)
RBC #/AREA URNS AUTO: ABNORMAL /HPF
SODIUM SERPL-SCNC: 144 MMOL/L (ref 136–145)
SP GR UR STRIP.AUTO: 1.02 (ref 1–1.03)
SQUAMOUS #/AREA URNS AUTO: ABNORMAL /HPF
TRIGL SERPL-MCNC: 92 MG/DL (ref 34–140)
TSH SERPL-ACNC: 1.78 UIU/ML (ref 0.35–4.94)
UROBILINOGEN UR STRIP-ACNC: 0.2
VLDLC SERPL CALC-MCNC: 18 MG/DL
WBC # BLD AUTO: 3.99 X10(3)/MCL (ref 4.5–11.5)
WBC #/AREA URNS AUTO: ABNORMAL /HPF

## 2024-06-17 PROCEDURE — 81003 URINALYSIS AUTO W/O SCOPE: CPT

## 2024-06-17 PROCEDURE — 85025 COMPLETE CBC W/AUTO DIFF WBC: CPT

## 2024-06-17 PROCEDURE — 87086 URINE CULTURE/COLONY COUNT: CPT

## 2024-06-17 PROCEDURE — 99213 OFFICE O/P EST LOW 20 MIN: CPT | Mod: 25,,, | Performed by: FAMILY MEDICINE

## 2024-06-17 PROCEDURE — 82570 ASSAY OF URINE CREATININE: CPT

## 2024-06-17 PROCEDURE — 82043 UR ALBUMIN QUANTITATIVE: CPT

## 2024-06-17 PROCEDURE — 36415 COLL VENOUS BLD VENIPUNCTURE: CPT

## 2024-06-17 PROCEDURE — 80053 COMPREHEN METABOLIC PANEL: CPT

## 2024-06-17 PROCEDURE — 84443 ASSAY THYROID STIM HORMONE: CPT

## 2024-06-17 PROCEDURE — G0439 PPPS, SUBSEQ VISIT: HCPCS | Mod: ,,, | Performed by: FAMILY MEDICINE

## 2024-06-17 PROCEDURE — 87077 CULTURE AEROBIC IDENTIFY: CPT

## 2024-06-17 PROCEDURE — 80061 LIPID PANEL: CPT

## 2024-06-17 PROCEDURE — 83036 HEMOGLOBIN GLYCOSYLATED A1C: CPT

## 2024-06-17 RX ORDER — KETOCONAZOLE 20 MG/G
CREAM TOPICAL 2 TIMES DAILY
Qty: 60 G | Refills: 1 | Status: SHIPPED | OUTPATIENT
Start: 2024-06-17 | End: 2024-06-20 | Stop reason: SDUPTHER

## 2024-06-17 RX ORDER — HYDROCODONE BITARTRATE AND ACETAMINOPHEN 10; 325 MG/1; MG/1
1 TABLET ORAL EVERY 6 HOURS PRN
COMMUNITY
Start: 2024-03-05 | End: 2024-06-17

## 2024-06-17 RX ORDER — TRAMADOL HYDROCHLORIDE 50 MG/1
50 TABLET ORAL EVERY 8 HOURS PRN
Qty: 20 TABLET | Refills: 0 | Status: SHIPPED | OUTPATIENT
Start: 2024-06-17 | End: 2024-06-20 | Stop reason: SDUPTHER

## 2024-06-17 RX ORDER — DOCUSATE SODIUM 100 MG/1
100 CAPSULE, LIQUID FILLED ORAL 2 TIMES DAILY
COMMUNITY
Start: 2024-03-05

## 2024-06-17 NOTE — ASSESSMENT & PLAN NOTE
Use otc face wash and cream as directed. Will send in rx for cream.  Use as directed. Will also place derm referral. Contact clinic for concerns. Patient is agreeable to plan and verbalized understanding

## 2024-06-17 NOTE — ASSESSMENT & PLAN NOTE
Keep appointments with neuro.  Will place referral to neurosurgery.       isabel reviewed.  Asking for tramadol refill. Last fill was 6/2023 for #20.  Will fill.  Reminded patient to use sparingly and only prn.  Cautioned may cause drowsiness and therefore do not take before work or driving. Patient is agreeable and verbalized understanding

## 2024-06-17 NOTE — ASSESSMENT & PLAN NOTE
"On statin and fenofibrate. Well controlled    Lab Results   Component Value Date    CHOL 93 06/14/2023    CHOL 122 06/29/2022    CHOL 103 05/12/2022     Lab Results   Component Value Date    HDL 37 06/14/2023    HDL 46 06/29/2022    HDL 40 05/12/2022     No results found for: "LDLCALC"  Lab Results   Component Value Date    TRIG 69 06/14/2023    TRIG 81 06/29/2022    TRIG 46 05/12/2022       No results found for: "CHOLHDL"    "

## 2024-06-17 NOTE — ASSESSMENT & PLAN NOTE
Fasting labs ordered. Will call with results when available.   Psa with urology  Prostate Specific Antigen (ng/mL)   Date Value   06/14/2023 1.00     Prostate Specific Antigen Free (ng/mL)   Date Value   05/12/2022 0.14     PSA % Free (%)   Date Value   05/12/2022 14     utd on colonoscopy 12/2015 with dr. Diaz. Repeat due 12/2025.   Declines immunizations today     Advanced care planning discussed and paperwork given

## 2024-06-17 NOTE — ASSESSMENT & PLAN NOTE
Lab Results   Component Value Date    HGBA1C 5.8 06/14/2023     Urine micro 6/2023  Foot exam today  Eye exam with dr. Whaley - will request    On statin and metformin. Continue on current meds.     Labs pending. Will call with results when available.  Consider holding metformin if labs stable. rtc in 6 months if med change with repeat labs.

## 2024-06-17 NOTE — PROGRESS NOTES
Patient ID: [id number]     Chief Complaint: Medicare AWV (Wellness )      HPI:     Lamine Preciado is a 53 y.o. male here today for a Medicare Wellness.      Patient presents to the clinic unaccompanied for his wellness visit.  He is due for labs.    C/o dry flaky patches to eyebrows and in side burns.  Face will get red and swollen after shaving.  Itches.  Uses soap and water to wash face. Nothing new or different.  Heat makes worse. Does not see derm.          He has carpal tunnel syndrome, chronic pain and cervical and lumbar radiculopathy.  He follows with Dr. Gore.  He had a stimulator placed in 2019 which has helped his symptoms however he was told he may need to get this updated.  c/o back back above his buttocks.  Asking for referral.  Will place referral to neurosurg.  He is asking for refill of his tramadol.  His last prescription of this was for # 20 Pills in 6/2023.  He had carpal tunnel surgery on his left with Dr. blanco 6/13/19 and right 12/17/20.  saw dr. Sánchez 7/2023 for left knee pain. Is better.       He also has diabetes.  His last foot exam was today.  His eye exams are done with Dr. Whaley. 4/2023.  Last urine micro was 6/2023. Last A1c was 5.8 6/2023. He is currently on metformin 500 mg in the evening.  He does not check his sugars.  He is following a diabetic diet.       He also has hyperlipidemia and is on atorvastatin and fenofibrate.     He has BPH and neurogenic bladder.  His urologist is Dr. Burnett.  sees him annually in october.  He checks his PSA. He had a TURP 12/2020 and 3/2024 (due to leakage). He is on VESIcare.  myrbetriq did not work well.  Had epididymitis 2/2024. Went to [facility].  Completed antibiotics.      He has depression and anxiety and follows with Dr. Gorman.  Doing IOP at [facility] in [place]. He is on Effexor, trazodone, and Klonopin.  Doing well.          He also has normocytic anemia and leukopenia and is being followed by Hematology, Dr. Durán, annually. He  is on oral iron.  Last appt 2/2024. Labs were stable.  Has follow up in 1 year     He had a colonoscopy done with Dr. Summers 12/2015 which recommended a repeat in 10 years.    He is not allergic to any medications.  He does not smoke.                Past Surgical History:   Procedure Laterality Date    APPENDECTOMY      CARPAL TUNNEL RELEASE      COLONOSCOPY  12/14/2015    SPINAL CORD STIMULATOR IMPLANT      TRIAL OF SPINAL CORD NERVE STIMULATOR         Review of patient's allergies indicates:  No Known Allergies    Outpatient Medications Marked as Taking for the 6/17/24 encounter (Office Visit) with Gayathri Beatty MD   Medication Sig Dispense Refill    aspirin (ECOTRIN) 81 MG EC tablet Take 81 mg by mouth once daily.      atorvastatin (LIPITOR) 80 MG tablet Take 1 tablet (80 mg total) by mouth once daily. 90 tablet 3    clonazePAM (KLONOPIN) 0.5 MG tablet Take 0.5 mg by mouth 2 (two) times daily.      docusate sodium (COLACE) 100 MG capsule Take 100 mg by mouth 2 (two) times daily.      iron,carb/vit C/vit B12/folic (IRON 100 PLUS ORAL) Take by mouth.      mecobalamin (B12 ACTIVE ORAL) Take by mouth.      melatonin 10 mg Tab Take by mouth.      metFORMIN (GLUCOPHAGE) 500 MG tablet Take 1 tablet (500 mg total) by mouth daily with breakfast. 90 tablet 3    multivit-minerals/FA/lycopene (ONE-A-DAY MEN'S ORAL) Take by mouth.      prednisoLONE acetate (PRED FORTE) 1 % DrpS Place into both eyes.      propylene glycol/peg 400/PF (SYSTANE, PF, OPHT) Apply to eye.      solifenacin (VESICARE) 10 MG tablet Take 10 mg by mouth once daily.      traZODone (DESYREL) 150 MG tablet nightly.      venlafaxine (EFFEXOR-XR) 150 MG Cp24 Take 150 mg by mouth 2 (two) times a day.      [DISCONTINUED] HYDROcodone-acetaminophen (NORCO)  mg per tablet Take 1 tablet by mouth every 6 (six) hours as needed.         Social History     Socioeconomic History    Marital status:    Tobacco Use    Smoking status: Never    Smokeless  tobacco: Never   Substance and Sexual Activity    Alcohol use: Not Currently    Drug use: Never    Sexual activity: Yes        Family History   Problem Relation Name Age of Onset    Heart disease Mother      Diabetes Mother      Hypertension Mother      Diabetes Father      Heart failure Father      Depression Sister          Patient Care Team:  Gayathri Beatty MD as PCP - General (Family Medicine)  Seven Winter II, MD as Consulting Physician (Oncology)  Maycol Gore MD (Inactive) as Consulting Physician (Neurology)  Gene Whaley MD as Consulting Physician (Ophthalmology)  Sameer Ballesteros Jr., MD as Consulting Physician (Psychiatry)  Raymond Burnett MD as Consulting Physician (Urology)  Dejuan Summers MD as Consulting Physician (Gastroenterology)  Jay Nieto Jr., MD as Consulting Physician (Orthopedic Surgery)       Subjective:     Review of Systems   Constitutional: Negative.    HENT: Negative.     Eyes: Negative.    Respiratory: Negative.     Cardiovascular: Negative.    Gastrointestinal: Negative.    Genitourinary: Negative.    Musculoskeletal:  Positive for back pain.   Skin: Negative.    Neurological: Negative.    Endo/Heme/Allergies: Negative.    Psychiatric/Behavioral: Negative.           Patient Reported Health Risk Assessment  What is your age?:  (53)  Are you male or female?: Male  During the past four weeks, how much have you been bothered by emotional problems such as feeling anxious, depressed, irritable, sad, or downhearted and blue?: Slightly  During the past five weeks, has your physical and/or emotional health limited your social activities with family, friends, neighbors, or groups?: Slightly  During the past four weeks, how much bodily pain have you generally had?: Moderate pain  During the past four weeks, was someone available to help if you needed and wanted help?: Yes, as much as I wanted  During the past four weeks, what was the hardest physical activity you  could do for at least two minutes?: Light  Can you get to places out of walking distance without help?  (For example, can you travel alone on buses or taxis, or drive your own car?): Yes  Can you go shopping for groceries or clothes without someone's help?: Yes  Can you prepare your own meals?: Yes  Can you do your own housework without help?: Yes  Because of any health problems, do you need the help of another person with your personal care needs such as eating, bathing, dressing, or getting around the house?: No  Can you handle your own money without help?: Yes  During the past four weeks, how would you rate your health in general?: Fair  How have things been going for you during the past four weeks?: Good and bad parts about equal  Are you having difficulties driving your car?: No  Do you always fasten your seat belt when you are in a car?: Yes, usually  How often in the past four weeks have you been bothered by falling or dizzy when standing up?: Never  How often in the past four weeks have you been bothered by sexual problems?: Never  How often in the past four weeks have you been bothered by trouble eating well?: Never  How often in the past four weeks have you been bothered by teeth or denture problems?: Never  How often in the past four weeks have you been bothered with problems using the telephone?: Never  How often in the past four weeks have you been bothered by tiredness or fatigue?: Never  Have you fallen two or more times in the past year?: No  Are you afraid of falling?: No  Are you a smoker?: No  During the past four weeks, how many drinks of wine, beer, or other alcoholic beverages did you have?: No alcohol at all  Do you exercise for about 20 minutes three or more days a week?: Yes, most of the time  Have you been given any information to help you with hazards in your house that might hurt you?: No  Have you been given any information to help you with keeping track of your medications?: No  How  "often do you have trouble taking medicines the way you've been told to take them?: I always take them as prescribed  How confident are you that you can control and manage most of your health problems?: Very confident  What is your race? (Check all that apply.):     Objective:     /78 (BP Location: Left arm)   Pulse 73   Temp 98.1 °F (36.7 °C) (Temporal)   Resp 16   Ht 5' 7" (1.702 m)   Wt 78.2 kg (172 lb 4.8 oz)   SpO2 97%   BMI 26.99 kg/m²     Physical Exam  Vitals and nursing note reviewed.   Constitutional:       Appearance: Normal appearance. He is normal weight.   HENT:      Head: Normocephalic.      Nose: Nose normal.      Mouth/Throat:      Mouth: Mucous membranes are moist.      Pharynx: Oropharynx is clear.   Eyes:      Extraocular Movements: Extraocular movements intact.   Cardiovascular:      Rate and Rhythm: Normal rate and regular rhythm.      Pulses:           Dorsalis pedis pulses are 2+ on the right side and 2+ on the left side.        Posterior tibial pulses are 2+ on the right side and 2+ on the left side.   Pulmonary:      Effort: Pulmonary effort is normal.      Breath sounds: Normal breath sounds.   Musculoskeletal:         General: Normal range of motion.        Feet:    Feet:      Right foot:      Protective Sensation: 8 sites tested.  8 sites sensed.      Skin integrity: Skin integrity normal.      Left foot:      Protective Sensation: 8 sites tested.  8 sites sensed.      Skin integrity: Skin integrity normal.   Skin:     General: Skin is warm and dry.      Comments: Dry flaky skin around eyebrows   Neurological:      General: No focal deficit present.      Mental Status: He is alert and oriented to person, place, and time. Mental status is at baseline.   Psychiatric:         Mood and Affect: Mood normal.               6/13/2022     1:00 PM   Checklist of Activities of Daily Living   Bathing Independent   Dressing Independent   Grooming Independent   Oral Care " Independent   Toileting Independent   Transferring Independent   Walking Independent   Climbing Stairs Independent   Eating Independent   Shopping Independent   Cooking Independent   Managing Medications Independent   Using the Phone Independent   Housework Indpendent   Laundry Independent   Driving Independent   Managing Finances Independent         6/17/2024     8:30 AM 2/23/2024     9:30 AM 7/13/2023     1:15 PM 7/6/2023     3:00 PM 7/5/2023     1:30 PM 6/14/2023     8:30 AM 2/14/2023    10:00 AM   Fall Risk Assessment - Outpatient   Mobility Status Ambulatory Ambulatory Ambulatory Ambulatory Ambulatory Ambulatory Ambulatory   Number of falls 0 0 0 0 0 0 0   Identified as fall risk False False False False False False False           Depression Screening  Over the past two weeks, has the patient felt down, depressed, or hopeless?: No  Over the past two weeks, has the patient felt little interest or pleasure in doing things?: No  Functional Ability/Safety Screening  Was the patient's timed Up & Go test unsteady or longer than 30 seconds?: No  Does the patient need help with phone, transportation, shopping, preparing meals, housework, laundry, meds, or managing money?: No  Does the patient's home have rugs in the hallway, lack grab bars in the bathroom, lack handrails on the stairs or have poor lighting?: No  Have you noticed any hearing difficulties?: No  Cognitive Function (Assessed through direct observation with due consideration of information obtained by way of patient reports and/or concerns raised by family, friends, caretakers, or others)    Does the patient repeat questions/statements in the same day?: No  Does the patient have trouble remembering the date, year, and time?: No  Does the patient have difficulty managing finances?: No  Does the patient have a decreased sense of direction?: No  Assessment/Plan:       Medicare Annual Wellness and Personalized Prevention Plan:   Fall Risk + Home Safety +  Hearing Impairment + Depression Screen + Cognitive Impairment Screen + Health Risk Assessment all reviewed.     Opioid Screening: Patient medication list reviewed, patient is not taking prescription opioids. Patient is using additional opioids than prescribed. Patient is not at low risk of substance abuse based on this opioid use history.         Health Maintenance Topics with due status: Not Due       Topic Last Completion Date    Colorectal Cancer Screening 12/14/2015    TETANUS VACCINE 02/02/2017    Influenza Vaccine 12/03/2020    Low Dose Statin 02/23/2024      The patient's Health Maintenance was reviewed and the following appears to be due at this time:   Health Maintenance Due   Topic Date Due    COVID-19 Vaccine (5 - 2023-24 season) 09/01/2023    Hemoglobin A1c  12/14/2023    Eye Exam  01/23/2024    Diabetes Urine Screening  06/14/2024    Foot Exam  06/14/2024    Lipid Panel  06/14/2024         1. Medicare annual wellness visit, subsequent  Assessment & Plan:  Fasting labs ordered. Will call with results when available.   Psa with urology  Prostate Specific Antigen (ng/mL)   Date Value   06/14/2023 1.00     Prostate Specific Antigen Free (ng/mL)   Date Value   05/12/2022 0.14     PSA % Free (%)   Date Value   05/12/2022 14     utd on colonoscopy 12/2015 with dr. Diaz. Repeat due 12/2025.   Declines immunizations today     Advanced care planning discussed and paperwork given      2. Advanced care planning/counseling discussion  Assessment & Plan:  Advanced care planning discussed and paperwork given.    I attest that I have had a face to face discussion with patient and or surrogate decision maker.   Included surrogate decision maker: NO  Advanced directive in chart: NO  LAPOST: NO    Total time spent: 16 minutes        3. Type 2 diabetes mellitus without complication, without long-term current use of insulin  Assessment & Plan:  Lab Results   Component Value Date    HGBA1C 5.8 06/14/2023     Urine micro  "6/2023  Foot exam today  Eye exam with dr. Whaley - will request    On statin and metformin. Continue on current meds.     Labs pending. Will call with results when available.  Consider holding metformin if labs stable. rtc in 6 months if med change with repeat labs.       4. Hyperlipidemia associated with type 2 diabetes mellitus  Assessment & Plan:  On statin and fenofibrate. Well controlled    Lab Results   Component Value Date    CHOL 93 06/14/2023    CHOL 122 06/29/2022    CHOL 103 05/12/2022     Lab Results   Component Value Date    HDL 37 06/14/2023    HDL 46 06/29/2022    HDL 40 05/12/2022     No results found for: "LDLCALC"  Lab Results   Component Value Date    TRIG 69 06/14/2023    TRIG 81 06/29/2022    TRIG 46 05/12/2022       No results found for: "CHOLHDL"        5. Carpal tunnel syndrome, unspecified laterality  Assessment & Plan:  See chronic pain A&P      6. Chronic pain syndrome  Assessment & Plan:  Keep appointments with neuro.  Will place referral to neurosurgery.       isabel reviewed.  Asking for tramadol refill. Last fill was 6/2023 for #20.  Will fill.  Reminded patient to use sparingly and only prn.  Cautioned may cause drowsiness and therefore do not take before work or driving. Patient is agreeable and verbalized understanding    Orders:  -     traMADoL (ULTRAM) 50 mg tablet; Take 1 tablet (50 mg total) by mouth every 8 (eight) hours as needed for Pain.  Dispense: 20 tablet; Refill: 0  -     Ambulatory referral/consult to Neurosurgery; Future; Expected date: 06/24/2024    7. History of back surgery  Assessment & Plan:  Keep appointments with neuro    Orders:  -     Ambulatory referral/consult to Neurosurgery; Future; Expected date: 06/24/2024    8. Cervical radiculopathy  Assessment & Plan:  See chronic pain A&P      9. Benign prostatic hyperplasia, unspecified whether lower urinary tract symptoms present  Assessment & Plan:  Stable.  Keep appointments with urology      10. Neurogenic " bladder  Assessment & Plan:  Stable on vesicare. Keep appointments with urology      11. Major depressive disorder, recurrent severe without psychotic features  Assessment & Plan:  Stable on current meds. Keep appointments with psych- dr. Gorman      12. Anxiety  Assessment & Plan:  See depression A&P      13. Normocytic anemia  Assessment & Plan:  On oral iron and b12.  Keep appointments with heme- dr. swenson      14. Cyclical neutropenia  Assessment & Plan:  Keep appointments with heme- dr. swenson      15. Seborrheic dermatitis  Assessment & Plan:  Use otc face wash and cream as directed. Will send in rx for cream.  Use as directed. Will also place derm referral. Contact clinic for concerns. Patient is agreeable to plan and verbalized understanding    Orders:  -     Ambulatory referral/consult to Dermatology; Future; Expected date: 06/24/2024  -     ketoconazole (NIZORAL) 2 % cream; Apply topically 2 (two) times daily. To affected area as directed.  Dispense: 60 g; Refill: 1       Separate complaint outside of wellness visit  CC: C/o dry flaky patches to eyebrows and in side burns.  Face will get red and swollen after shaving.  Itches.  Uses soap and water to wash face. Nothing new or different.  Heat makes worse. Does not see derm.     ROS: negative other than above  PE: negative other than above  A/P: Use otc face wash and cream as directed. Will send in rx for cream.  Use as directed. Will also place derm referral. Contact clinic for concerns. Patient is agreeable to plan and verbalized understanding.             Advance Care Planning   I attest to discussing Advance Care Planning with patient and/or family member.  Education was provided including the importance of the Health Care Power of , Advance Directives, and/or [place] documentation.  The patient expressed understanding to the importance of this information and discussion.  Length of ACP conversation in minutes: 16         Medication List with  Changes/Refills   New Medications    KETOCONAZOLE (NIZORAL) 2 % CREAM    Apply topically 2 (two) times daily. To affected area as directed.       Start Date: 6/17/2024 End Date: --   Current Medications    ASPIRIN (ECOTRIN) 81 MG EC TABLET    Take 81 mg by mouth once daily.       Start Date: --        End Date: --    ATORVASTATIN (LIPITOR) 80 MG TABLET    Take 1 tablet (80 mg total) by mouth once daily.       Start Date: 6/14/2023 End Date: --    CLONAZEPAM (KLONOPIN) 0.5 MG TABLET    Take 0.5 mg by mouth 2 (two) times daily.       Start Date: 3/10/2022 End Date: --    DOCUSATE SODIUM (COLACE) 100 MG CAPSULE    Take 100 mg by mouth 2 (two) times daily.       Start Date: 3/5/2024  End Date: --    FENOFIBRATE 160 MG TAB    Take 1 tablet (160 mg total) by mouth once daily.       Start Date: 6/14/2023 End Date: 6/13/2024    IRON,CARB/VIT C/VIT B12/FOLIC (IRON 100 PLUS ORAL)    Take by mouth.       Start Date: --        End Date: --    MECOBALAMIN (B12 ACTIVE ORAL)    Take by mouth.       Start Date: --        End Date: --    MELATONIN 10 MG TAB    Take by mouth.       Start Date: --        End Date: --    METFORMIN (GLUCOPHAGE) 500 MG TABLET    Take 1 tablet (500 mg total) by mouth daily with breakfast.       Start Date: 6/14/2023 End Date: --    MULTIVIT-MINERALS/FA/LYCOPENE (ONE-A-DAY MEN'S ORAL)    Take by mouth.       Start Date: --        End Date: --    PREDNISOLONE ACETATE (PRED FORTE) 1 % DRPS    Place into both eyes.       Start Date: 6/13/2023 End Date: --    PROPYLENE GLYCOL//PF (SYSTANE, PF, OPHT)    Apply to eye.       Start Date: --        End Date: --    SOLIFENACIN (VESICARE) 10 MG TABLET    Take 10 mg by mouth once daily.       Start Date: --        End Date: --    TRAZODONE (DESYREL) 150 MG TABLET    nightly.       Start Date: 4/25/2022 End Date: --    VENLAFAXINE (EFFEXOR-XR) 150 MG CP24    Take 150 mg by mouth 2 (two) times a day.       Start Date: 12/13/2021End Date: --   Changed and/or  Refilled Medications    Modified Medication Previous Medication    TRAMADOL (ULTRAM) 50 MG TABLET traMADoL (ULTRAM) 50 mg tablet       Take 1 tablet (50 mg total) by mouth every 8 (eight) hours as needed for Pain.    Take 1 tablet (50 mg total) by mouth every 8 (eight) hours as needed for Pain.       Start Date: 6/17/2024 End Date: --    Start Date: 6/14/2023 End Date: 6/17/2024   Discontinued Medications    DOCUSATE SODIUM (COLACE ORAL)    Take by mouth.       Start Date: --        End Date: 6/17/2024    HYDROCODONE-ACETAMINOPHEN (NORCO)  MG PER TABLET    Take 1 tablet by mouth every 6 (six) hours as needed.       Start Date: 3/5/2024  End Date: 6/17/2024    TERAZOSIN (HYTRIN) 5 MG CAPSULE    Take 1 capsule (5 mg total) by mouth every evening.       Start Date: 4/11/2023 End Date: 6/17/2024        Follow up in about 1 year (around 6/17/2025) for Medicare Wellness with labs. In addition to their scheduled follow up, the patient has also been instructed to follow up on as needed basis.

## 2024-06-17 NOTE — PROGRESS NOTES
Please inform patient of results.    1. Ua had some wbc and bacteria.  Is he having any uti symptoms? If yes, please call lab to order culture.  Encourage  fluids. Follow up with urology  2. A1c is up 6.2.  is now in prediabetes range.  Need to watch diet/exercise.     Other labwork within acceptable ranges.

## 2024-06-18 NOTE — PROGRESS NOTES
Please inform patient of results.    1. Prelim urine culture shows possible infection. Is he having any symptoms? Encourage fluids. Will call with results when available and send in antibiotics if needed.

## 2024-06-19 ENCOUNTER — PATIENT MESSAGE (OUTPATIENT)
Dept: FAMILY MEDICINE | Facility: CLINIC | Age: 54
End: 2024-06-19
Payer: MEDICARE

## 2024-06-19 DIAGNOSIS — L21.9 SEBORRHEIC DERMATITIS: ICD-10-CM

## 2024-06-19 DIAGNOSIS — G89.4 CHRONIC PAIN SYNDROME: ICD-10-CM

## 2024-06-19 LAB — BACTERIA UR CULT: ABNORMAL

## 2024-06-19 RX ORDER — NITROFURANTOIN 25; 75 MG/1; MG/1
100 CAPSULE ORAL 2 TIMES DAILY
Qty: 20 CAPSULE | Refills: 0 | Status: SHIPPED | OUTPATIENT
Start: 2024-06-19 | End: 2024-06-20 | Stop reason: SDUPTHER

## 2024-06-19 NOTE — PROGRESS NOTES
Please inform patient of results.    1. Final urine culture shows infection. I will send in rx for macrobid.  Take as directed. Monitor symptoms. Contact clinic for concerns.     Other labwork within acceptable ranges.

## 2024-06-20 RX ORDER — TRAMADOL HYDROCHLORIDE 50 MG/1
50 TABLET ORAL EVERY 8 HOURS PRN
Qty: 20 TABLET | Refills: 0 | Status: SHIPPED | OUTPATIENT
Start: 2024-06-20

## 2024-06-20 RX ORDER — NITROFURANTOIN 25; 75 MG/1; MG/1
100 CAPSULE ORAL 2 TIMES DAILY
Qty: 20 CAPSULE | Refills: 0 | Status: SHIPPED | OUTPATIENT
Start: 2024-06-20 | End: 2024-06-30

## 2024-06-20 RX ORDER — KETOCONAZOLE 20 MG/G
CREAM TOPICAL 2 TIMES DAILY
Qty: 60 G | Refills: 1 | Status: SHIPPED | OUTPATIENT
Start: 2024-06-20

## 2024-07-25 ENCOUNTER — TELEPHONE (OUTPATIENT)
Dept: FAMILY MEDICINE | Facility: CLINIC | Age: 54
End: 2024-07-25
Payer: MEDICARE

## 2024-07-25 DIAGNOSIS — E11.69 HYPERLIPIDEMIA ASSOCIATED WITH TYPE 2 DIABETES MELLITUS: ICD-10-CM

## 2024-07-25 DIAGNOSIS — E78.5 HYPERLIPIDEMIA ASSOCIATED WITH TYPE 2 DIABETES MELLITUS: ICD-10-CM

## 2024-07-25 DIAGNOSIS — E11.9 TYPE 2 DIABETES MELLITUS WITHOUT COMPLICATION, WITHOUT LONG-TERM CURRENT USE OF INSULIN: ICD-10-CM

## 2024-07-25 RX ORDER — METFORMIN HYDROCHLORIDE 500 MG/1
500 TABLET ORAL
Qty: 90 TABLET | Refills: 3 | Status: SHIPPED | OUTPATIENT
Start: 2024-07-25

## 2024-07-25 RX ORDER — ATORVASTATIN CALCIUM 80 MG/1
80 TABLET, FILM COATED ORAL DAILY
Qty: 90 TABLET | Refills: 3 | Status: SHIPPED | OUTPATIENT
Start: 2024-07-25

## 2024-07-25 NOTE — TELEPHONE ENCOUNTER
----- Message from Arcelia Machuca sent at 7/25/2024  2:15 PM CDT -----  .Who Called: Lamine Preciado    Refill or New Rx:Refill  RX Name and Strength:metFORMIN (GLUCOPHAGE) 500 MG tablet  How is the patient currently taking it? (ex. 1XDay):1x day  Is this a 30 day or 90 day RX:90  Local or Mail Order:local  List of preferred pharmacies on file (remove unneeded): [unfilled]  If different Pharmacy is requested, enter Pharmacy information here including location and phone number: [facility]   Ordering Provider:Jaci      Preferred Method of Contact: Phone Call  Patient's Preferred Phone Number on File: [phone]   Best Call Back Number, if different:  Additional Information: refills   .Who Called: Lamine Preciado    Refill or New Rx:Refill  RX Name and Strength:atorvastatin (LIPITOR) 80 MG tablet  How is the patient currently taking it? (ex. 1XDay):1x day  Is this a 30 day or 90 day RX:90  Local or Mail Order:local  List of preferred pharmacies on file (remove unneeded): @[facility]@  If different Pharmacy is requested, enter Pharmacy information here including location and phone number: [facility]   Ordering Provider:Jaci      Preferred Method of Contact: Phone Call  Patient's Preferred Phone Number on File: [phone]   Best Call Back Number, if different:  Additional Information: refills

## 2024-08-30 DIAGNOSIS — E78.5 HYPERLIPIDEMIA ASSOCIATED WITH TYPE 2 DIABETES MELLITUS: ICD-10-CM

## 2024-08-30 DIAGNOSIS — E11.69 HYPERLIPIDEMIA ASSOCIATED WITH TYPE 2 DIABETES MELLITUS: ICD-10-CM

## 2024-08-30 RX ORDER — FENOFIBRATE 160 MG/1
160 TABLET ORAL DAILY
Qty: 90 TABLET | Refills: 3 | Status: SHIPPED | OUTPATIENT
Start: 2024-08-30 | End: 2025-08-30

## 2024-08-30 NOTE — TELEPHONE ENCOUNTER
----- Message from Yun Lewis sent at 8/30/2024  9:25 AM CDT -----  Who Called: Lamine Preciado    Refill or New Rx:Refill  RX Name and Strength:fenofibrate 160 MG Tab  How is the patient currently taking it? (ex. 1XDay):1x  Is this a 30 day or 90 day RX:  Local or Mail Order:  List of preferred pharmacies on file (remove unneeded): [unfilled]  If different Pharmacy is requested, enter Pharmacy information here including location and phone number: [facility] [address] [place] [address] [id number]   Ordering Provider:        Patient's Preferred Phone Number on File: [phone]   Best Call Back Number, if different:  Additional Information:

## 2024-09-16 PROBLEM — Z00.00 MEDICARE ANNUAL WELLNESS VISIT, SUBSEQUENT: Status: RESOLVED | Noted: 2022-06-13 | Resolved: 2024-09-16

## 2025-01-29 ENCOUNTER — LAB VISIT (OUTPATIENT)
Dept: LAB | Facility: HOSPITAL | Age: 55
End: 2025-01-29
Attending: FAMILY MEDICINE
Payer: COMMERCIAL

## 2025-01-29 ENCOUNTER — CLINICAL SUPPORT (OUTPATIENT)
Dept: FAMILY MEDICINE | Facility: CLINIC | Age: 55
End: 2025-01-29
Attending: FAMILY MEDICINE
Payer: COMMERCIAL

## 2025-01-29 ENCOUNTER — OFFICE VISIT (OUTPATIENT)
Dept: FAMILY MEDICINE | Facility: CLINIC | Age: 55
End: 2025-01-29
Payer: COMMERCIAL

## 2025-01-29 VITALS
SYSTOLIC BLOOD PRESSURE: 132 MMHG | WEIGHT: 162 LBS | DIASTOLIC BLOOD PRESSURE: 80 MMHG | OXYGEN SATURATION: 98 % | RESPIRATION RATE: 16 BRPM | HEART RATE: 68 BPM | HEIGHT: 67 IN | TEMPERATURE: 98 F | BODY MASS INDEX: 25.43 KG/M2

## 2025-01-29 DIAGNOSIS — D64.9 NORMOCYTIC ANEMIA: ICD-10-CM

## 2025-01-29 DIAGNOSIS — L98.9 SKIN LESION: ICD-10-CM

## 2025-01-29 DIAGNOSIS — M54.2 NECK PAIN: ICD-10-CM

## 2025-01-29 DIAGNOSIS — Z11.3 SCREEN FOR STD (SEXUALLY TRANSMITTED DISEASE): ICD-10-CM

## 2025-01-29 DIAGNOSIS — E11.9 TYPE 2 DIABETES MELLITUS WITHOUT COMPLICATION, WITHOUT LONG-TERM CURRENT USE OF INSULIN: ICD-10-CM

## 2025-01-29 DIAGNOSIS — E11.9 TYPE 2 DIABETES MELLITUS WITHOUT COMPLICATION, WITHOUT LONG-TERM CURRENT USE OF INSULIN: Primary | ICD-10-CM

## 2025-01-29 DIAGNOSIS — R10.9 RIGHT FLANK PAIN: ICD-10-CM

## 2025-01-29 DIAGNOSIS — D70.4 CYCLICAL NEUTROPENIA: ICD-10-CM

## 2025-01-29 LAB
ALBUMIN SERPL-MCNC: 4.4 G/DL (ref 3.5–5)
ALBUMIN/GLOB SERPL: 1.5 RATIO (ref 1.1–2)
ALP SERPL-CCNC: 57 UNIT/L (ref 40–150)
ALT SERPL-CCNC: 29 UNIT/L (ref 0–55)
ANION GAP SERPL CALC-SCNC: 6 MEQ/L
AST SERPL-CCNC: 33 UNIT/L (ref 5–34)
BASOPHILS # BLD AUTO: 0.02 X10(3)/MCL
BASOPHILS NFR BLD AUTO: 0.6 %
BILIRUB SERPL-MCNC: 0.4 MG/DL
BUN SERPL-MCNC: 17.7 MG/DL (ref 8.4–25.7)
C TRACH DNA SPEC QL NAA+PROBE: NOT DETECTED
CALCIUM SERPL-MCNC: 9.7 MG/DL (ref 8.4–10.2)
CHLORIDE SERPL-SCNC: 106 MMOL/L (ref 98–107)
CO2 SERPL-SCNC: 29 MMOL/L (ref 22–29)
CREAT SERPL-MCNC: 0.82 MG/DL (ref 0.72–1.25)
CREAT/UREA NIT SERPL: 22
EOSINOPHIL # BLD AUTO: 0.07 X10(3)/MCL (ref 0–0.9)
EOSINOPHIL NFR BLD AUTO: 2.1 %
ERYTHROCYTE [DISTWIDTH] IN BLOOD BY AUTOMATED COUNT: 12.5 % (ref 11.5–17)
GFR SERPLBLD CREATININE-BSD FMLA CKD-EPI: >60 ML/MIN/1.73/M2
GLOBULIN SER-MCNC: 2.9 GM/DL (ref 2.4–3.5)
GLUCOSE SERPL-MCNC: 87 MG/DL (ref 74–100)
HAV IGM SERPL QL IA: NONREACTIVE
HBV CORE IGM SERPL QL IA: NONREACTIVE
HBV SURFACE AG SERPL QL IA: NONREACTIVE
HCT VFR BLD AUTO: 40.4 % (ref 42–52)
HCV AB SERPL QL IA: NONREACTIVE
HGB BLD-MCNC: 13.9 G/DL (ref 14–18)
HIV 1+2 AB+HIV1 P24 AG SERPL QL IA: NONREACTIVE
IMM GRANULOCYTES # BLD AUTO: 0 X10(3)/MCL (ref 0–0.04)
IMM GRANULOCYTES NFR BLD AUTO: 0 %
LYMPHOCYTES # BLD AUTO: 1.5 X10(3)/MCL (ref 0.6–4.6)
LYMPHOCYTES NFR BLD AUTO: 45.7 %
MCH RBC QN AUTO: 31.4 PG (ref 27–31)
MCHC RBC AUTO-ENTMCNC: 34.4 G/DL (ref 33–36)
MCV RBC AUTO: 91.4 FL (ref 80–94)
MONOCYTES # BLD AUTO: 0.28 X10(3)/MCL (ref 0.1–1.3)
MONOCYTES NFR BLD AUTO: 8.5 %
N GONORRHOEA DNA SPEC QL NAA+PROBE: NOT DETECTED
NEUTROPHILS # BLD AUTO: 1.41 X10(3)/MCL (ref 2.1–9.2)
NEUTROPHILS NFR BLD AUTO: 43.1 %
NRBC BLD AUTO-RTO: 0 %
PLATELET # BLD AUTO: 215 X10(3)/MCL (ref 130–400)
PMV BLD AUTO: 8.8 FL (ref 7.4–10.4)
POTASSIUM SERPL-SCNC: 4.5 MMOL/L (ref 3.5–5.1)
PROT SERPL-MCNC: 7.3 GM/DL (ref 6.4–8.3)
RBC # BLD AUTO: 4.42 X10(6)/MCL (ref 4.7–6.1)
SODIUM SERPL-SCNC: 141 MMOL/L (ref 136–145)
SOURCE (OHS): NORMAL
WBC # BLD AUTO: 3.28 X10(3)/MCL (ref 4.5–11.5)

## 2025-01-29 PROCEDURE — 87491 CHLMYD TRACH DNA AMP PROBE: CPT

## 2025-01-29 PROCEDURE — 86780 TREPONEMA PALLIDUM: CPT

## 2025-01-29 PROCEDURE — 85025 COMPLETE CBC W/AUTO DIFF WBC: CPT

## 2025-01-29 PROCEDURE — 36415 COLL VENOUS BLD VENIPUNCTURE: CPT

## 2025-01-29 PROCEDURE — 80053 COMPREHEN METABOLIC PANEL: CPT

## 2025-01-29 PROCEDURE — 87661 TRICHOMONAS VAGINALIS AMPLIF: CPT

## 2025-01-29 PROCEDURE — 80074 ACUTE HEPATITIS PANEL: CPT

## 2025-01-29 PROCEDURE — 87389 HIV-1 AG W/HIV-1&-2 AB AG IA: CPT

## 2025-01-29 RX ORDER — MELOXICAM 15 MG/1
15 TABLET ORAL DAILY
Qty: 30 TABLET | Refills: 1 | Status: SHIPPED | OUTPATIENT
Start: 2025-01-29 | End: 2025-03-30

## 2025-01-29 NOTE — ASSESSMENT & PLAN NOTE
Concerning for BCC. Will place referral for evaluation.  Advised patient to contact clinic for any concerns.

## 2025-01-29 NOTE — ASSESSMENT & PLAN NOTE
No trauma or injury. Already prescribed trazodone, tramadol and klonopin.  Will avoid muscle relaxers due to concern of increased drowsiness.  Will place referral for PT and send in rx for mobic. Take as directed. Ok to use heat, massage and muscle rubs. Monitor symptoms. Contact clinic for concerns. Patient is agreeable to plan and verbalized understanding

## 2025-01-29 NOTE — ASSESSMENT & PLAN NOTE
Lab Results   Component Value Date    HGBA1C 6.2 06/17/2024     Urine micro 6/2023  Foot exam 6/2024  Eye exam  in office today    On statin and metformin. Continue on current prescription.      Consider holding metformin if labs stable. rtc in 6 months if med change with repeat labs.

## 2025-01-29 NOTE — ASSESSMENT & PLAN NOTE
Recent passed stone. Will get imaging to rule out any remaining stones. Encourage fluids. Monitor symptoms. Contact clinic for concerns.  Keep appts with urology
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
## 2025-01-30 LAB — T PALLIDUM AB SER QL: NONREACTIVE

## 2025-01-30 NOTE — PROGRESS NOTES
Please inform patient of results.    1. Was his RPR not drawn?  2. Trichomonas is still pending. Will call with results  3. Hiv, hep, gonorrhea and chlamydia all negative This is a surgical and/or non-medical patient.

## 2025-01-31 LAB
SPECIMEN SOURCE: NORMAL
T VAGINALIS RRNA SPEC QL NAA+PROBE: NEGATIVE

## 2025-02-01 NOTE — PROGRESS NOTES
Please inform patient of results.    1. Trichomonas testing is negative    Other labwork within acceptable ranges.

## 2025-02-03 ENCOUNTER — TELEPHONE (OUTPATIENT)
Dept: FAMILY MEDICINE | Facility: CLINIC | Age: 55
End: 2025-02-03
Payer: COMMERCIAL

## 2025-02-03 NOTE — TELEPHONE ENCOUNTER
----- Message from Gayathri Beatty MD sent at 2/3/2025  8:50 AM CST -----  Diabetic Eye Results: Please inform patient of NORMAL Diabetic Eye Screen, indicating no signs of damage to the retina from diabetes. Repeat screening in 1 year.

## 2025-02-17 ENCOUNTER — HOSPITAL ENCOUNTER (OUTPATIENT)
Dept: RADIOLOGY | Facility: HOSPITAL | Age: 55
Discharge: HOME | End: 2025-02-17
Attending: FAMILY MEDICINE
Payer: COMMERCIAL

## 2025-02-17 ENCOUNTER — RESULTS FOLLOW-UP (OUTPATIENT)
Dept: FAMILY MEDICINE | Facility: CLINIC | Age: 55
End: 2025-02-17
Payer: COMMERCIAL

## 2025-02-17 DIAGNOSIS — R10.9 RIGHT FLANK PAIN: ICD-10-CM

## 2025-02-17 PROCEDURE — 76770 US EXAM ABDO BACK WALL COMP: CPT | Mod: TC

## 2025-02-17 NOTE — PROGRESS NOTES
Please inform patient of results.    1. US renal shows Real-time imaging was performed through the retroperitoneum evaluating the kidneys. Arterial and venous flow are identified within the kidneys. No hydronephrosis is seen. No obvious abnormal renal mass or renal stone is appreciated. No free fluid collection is seen. A 0.6 cm echogenic focus with posterior shadowing is noted to the base of the bladder suspicious for a stone. The bladder is partially distended with fluid.     We can forward these results to dr. Burnett.

## 2025-02-18 NOTE — TELEPHONE ENCOUNTER
----- Message from Gayathri Beatty MD sent at 2/17/2025  5:33 PM CST -----  Please inform patient of results.    1. US renal shows Real-time imaging was performed through the retroperitoneum evaluating the kidneys. Arterial and venous flow are identified within the kidneys. No hydronephrosis is seen. No obvious   abnormal renal mass or renal stone is appreciated. No free fluid collection is seen. A 0.6 cm echogenic focus with posterior shadowing is noted to the base of the bladder suspicious for a stone. The   bladder is partially distended with fluid.     We can forward these results to dr. Burnett.     ----- Message -----  From: [email or web address], Rad Results In  Sent: 2/17/2025  12:34 PM CST  To: Gayathri Beatty MD

## 2025-02-21 ENCOUNTER — LAB VISIT (OUTPATIENT)
Dept: LAB | Facility: HOSPITAL | Age: 55
End: 2025-02-21
Attending: NURSE PRACTITIONER
Payer: COMMERCIAL

## 2025-02-21 DIAGNOSIS — D70.4 CYCLICAL NEUTROPENIA: ICD-10-CM

## 2025-02-21 DIAGNOSIS — D70.4 CYCLICAL NEUTROPENIA: Primary | ICD-10-CM

## 2025-02-21 LAB
ALBUMIN SERPL-MCNC: 4.3 G/DL (ref 3.5–5)
ALBUMIN/GLOB SERPL: 1.6 RATIO (ref 1.1–2)
ALP SERPL-CCNC: 55 UNIT/L (ref 40–150)
ALT SERPL-CCNC: 32 UNIT/L (ref 0–55)
ANION GAP SERPL CALC-SCNC: 7 MEQ/L
AST SERPL-CCNC: 30 UNIT/L (ref 5–34)
BASOPHILS # BLD AUTO: 0.02 X10(3)/MCL
BASOPHILS NFR BLD AUTO: 0.5 %
BILIRUB SERPL-MCNC: 0.3 MG/DL
BUN SERPL-MCNC: 13.5 MG/DL (ref 8.4–25.7)
CALCIUM SERPL-MCNC: 10.2 MG/DL (ref 8.4–10.2)
CHLORIDE SERPL-SCNC: 104 MMOL/L (ref 98–107)
CO2 SERPL-SCNC: 27 MMOL/L (ref 22–29)
CREAT SERPL-MCNC: 0.94 MG/DL (ref 0.72–1.25)
CREAT/UREA NIT SERPL: 14
EOSINOPHIL # BLD AUTO: 0.02 X10(3)/MCL (ref 0–0.9)
EOSINOPHIL NFR BLD AUTO: 0.5 %
ERYTHROCYTE [DISTWIDTH] IN BLOOD BY AUTOMATED COUNT: 12.2 % (ref 11.5–17)
GFR SERPLBLD CREATININE-BSD FMLA CKD-EPI: >60 ML/MIN/1.73/M2
GLOBULIN SER-MCNC: 2.7 GM/DL (ref 2.4–3.5)
GLUCOSE SERPL-MCNC: 104 MG/DL (ref 74–100)
HCT VFR BLD AUTO: 41 % (ref 42–52)
HGB BLD-MCNC: 13.7 G/DL (ref 14–18)
IMM GRANULOCYTES # BLD AUTO: 0 X10(3)/MCL (ref 0–0.04)
IMM GRANULOCYTES NFR BLD AUTO: 0 %
LYMPHOCYTES # BLD AUTO: 1.64 X10(3)/MCL (ref 0.6–4.6)
LYMPHOCYTES NFR BLD AUTO: 45.1 %
MCH RBC QN AUTO: 31.3 PG (ref 27–31)
MCHC RBC AUTO-ENTMCNC: 33.4 G/DL (ref 33–36)
MCV RBC AUTO: 93.6 FL (ref 80–94)
MONOCYTES # BLD AUTO: 0.44 X10(3)/MCL (ref 0.1–1.3)
MONOCYTES NFR BLD AUTO: 12.1 %
NEUTROPHILS # BLD AUTO: 1.52 X10(3)/MCL (ref 2.1–9.2)
NEUTROPHILS NFR BLD AUTO: 41.8 %
PLATELET # BLD AUTO: 214 X10(3)/MCL (ref 130–400)
PMV BLD AUTO: 8.6 FL (ref 7.4–10.4)
POTASSIUM SERPL-SCNC: 4.4 MMOL/L (ref 3.5–5.1)
PROT SERPL-MCNC: 7 GM/DL (ref 6.4–8.3)
RBC # BLD AUTO: 4.38 X10(6)/MCL (ref 4.7–6.1)
SODIUM SERPL-SCNC: 138 MMOL/L (ref 136–145)
WBC # BLD AUTO: 3.64 X10(3)/MCL (ref 4.5–11.5)

## 2025-02-21 PROCEDURE — 36415 COLL VENOUS BLD VENIPUNCTURE: CPT

## 2025-02-21 PROCEDURE — 80053 COMPREHEN METABOLIC PANEL: CPT

## 2025-02-21 PROCEDURE — 85025 COMPLETE CBC W/AUTO DIFF WBC: CPT

## 2025-02-21 NOTE — PROGRESS NOTES
Subjective:       Patient ID: Lamine Preciado is a 54 y.o. male.    Chief Complaint: Follow-up (Patient reports pain in back )      Diagnosis:  Leukopenia   Normocytic anemia     Current Treatment:   OTC Iron once/day.     Treatment History:  Not applicable    HPI:  Patient kindly referred by Dr. Gonzales for further evaluation of leukopenia along with mild/borderline normocytic anemia. The patient began developing signs of anemia and leukopenia in early 2016 with CBC in April of that year showed WBC of 2.6 and hemoglobin of 11.9, with relative decrease in his neutrophils.  He was followed periodically after that and had a white count that went up and down, never completely normalizing. His H&H improved during that time and additional testing for anemia such as iron studies B12 folate and haptoglobin all came back unremarkable.  The patient had labs done with Dr. Gonzales on November 2, 2017 which demonstrated mildly decreased hemoglobin of 12.4 WBC of 3.3, prompting referral to hematology for further workup.  Clinically, the patient presented at the time of his initial evaluation with us with increasing fatigue of approximately 3-6 months duration without any associated night sweats or unintentional weight loss, or any fevers chills or chronic infections.  Labs done December 7, 2017 show normal iron studies with serum iron of 106, TIBC 399, ferritin of 60.5. B12 and folate normal at 1630.9 respectively. Haptoglobin normal at 91. Serum protein electrophoresis and immunofixation unremarkable/negative. Peripheral smear with nonspecific findings showing mild normochromic normocytic anemia with no schistocytes and slight increase in reactive lymphocytes with normal platelet number and morphology.  Reticulocyte count normal at 2.0. Mia antibody testing negative.  Additional lab tests including HIV and hepatitis panel all negative. Rheumatoid factor normal at 10. SAMUEL negative with double-stranded DNA less than 1.  Ultrasound of the abdomen done December 15, 2017 shows normal size of the spleen and liver with fatty liver only.  BM Bx done 12/22/2017 noted mild normocytic, normochromic anemia, mild neutropenia. Normocellular marrow with trilineage hematopoiesis w/o specific abnormalities noted.  Patient's labs have essentially been stable since that time.       Interval History:   Patient here for scheduled follow up for leukopenia and anemia.   The patient continues to do well from a hematological standpoint.  He denies any issues with frequent infections or illness.  He did state to have a small little skin tag versus growth by his right nostril.  He started using Neosporin, this spontaneously got better.  He will be seeing Dermatology for this.      Past Medical History:   Diagnosis Date    Cervical radiculopathy     GERD (gastroesophageal reflux disease)     Leukopenia     Neurogenic bladder     Post laminectomy syndrome     Sleep disturbance     Snoring       Past Surgical History:   Procedure Laterality Date    APPENDECTOMY      CARPAL TUNNEL RELEASE      COLONOSCOPY  12/14/2015    SPINAL CORD STIMULATOR IMPLANT      TRIAL OF SPINAL CORD NERVE STIMULATOR       Social History     Socioeconomic History    Marital status:    Tobacco Use    Smoking status: Never    Smokeless tobacco: Never   Substance and Sexual Activity    Alcohol use: Not Currently    Drug use: Never    Sexual activity: Yes     Social Drivers of Health     Financial Resource Strain: High Risk (1/29/2025)    Overall Financial Resource Strain (CARDIA)     Difficulty of Paying Living Expenses: Hard   Food Insecurity: Food Insecurity Present (1/29/2025)    Hunger Vital Sign     Worried About Running Out of Food in the Last Year: Often true     Ran Out of Food in the Last Year: Often true   Physical Activity: Unknown (1/29/2025)    Exercise Vital Sign     Days of Exercise per Week: 7 days   Stress: Stress Concern Present (1/29/2025)    [employer] - Occupational Stress Questionnaire     Feeling of Stress : To some extent   Housing Stability: High Risk (1/29/2025)    Housing Stability Vital Sign     Unable to Pay for Housing in the Last Year: Yes      Family History   Problem Relation Name Age of Onset    Heart disease Mother      Diabetes Mother      Hypertension Mother      Diabetes Father      Heart failure Father      Depression Sister        Review of patient's allergies indicates:  No Known Allergies   Review of Systems   Constitutional:  Negative for chills, diaphoresis, fatigue, fever and unexpected weight change.   HENT:  Negative for nasal congestion, mouth sores, sinus pressure/congestion and sore throat.    Eyes:  Negative for pain and visual disturbance.   Respiratory:  Negative for cough, chest tightness and shortness of breath.    Cardiovascular:  Negative for chest pain, palpitations and leg swelling.   Gastrointestinal:  Negative for abdominal distention, abdominal pain, blood in stool, constipation and diarrhea.   Genitourinary:  Negative for dysuria, frequency and hematuria.   Musculoskeletal:  Negative for arthralgias and back pain.   Integumentary:  Negative for rash.   Neurological:  Negative for dizziness, weakness, numbness and headaches.   Hematological:  Negative for adenopathy.   Psychiatric/Behavioral:  Negative for confusion.          Objective:      Physical Exam  Vitals reviewed.   Constitutional:       General: He is awake.      Appearance: Normal appearance.   HENT:      Head: Normocephalic and atraumatic.      Right Ear: Hearing normal.      Left Ear: Hearing normal.      Nose: Nose normal.   Eyes:      General: Lids are normal. Vision grossly intact.      Extraocular Movements: Extraocular movements intact.      Conjunctiva/sclera: Conjunctivae normal.   Cardiovascular:      Rate and Rhythm: Normal rate and regular rhythm.      Pulses: Normal pulses.      Heart sounds: Normal heart sounds.   Pulmonary:       Effort: Pulmonary effort is normal.      Breath sounds: Normal breath sounds. No wheezing, rhonchi or rales.   Abdominal:      General: Bowel sounds are normal.      Palpations: Abdomen is soft.      Tenderness: There is no abdominal tenderness.   Musculoskeletal:      Cervical back: Full passive range of motion without pain.      Right lower leg: No edema.      Left lower leg: No edema.   Lymphadenopathy:      Cervical: No cervical adenopathy.      Upper Body:      Right upper body: No supraclavicular or axillary adenopathy.      Left upper body: No supraclavicular or axillary adenopathy.   Skin:     General: Skin is warm.   Neurological:      General: No focal deficit present.      Mental Status: He is alert and oriented to person, place, and time.   Psychiatric:         Attention and Perception: Attention normal.         Mood and Affect: Mood and affect normal.         Behavior: Behavior is cooperative.         LABS AND IMAGING REVIEWED IN EPIC          Assessment:     1. Leukopenia   2. Normocytic anemia      Plan:       Continue with p.o. iron supplementation.    Labs remain very stable.     We will see the patient back in 1 year with a CBC, CMP     I explained that we would only repeat a bone marrow biopsy if his labs changed significantly or if his hemoglobin/hematocrit or platelet count started to decrease.     He knows to call sooner if any new signs or symptoms occur.     He voiced understanding with all questions answered    Seven Winter II, MD

## 2025-02-24 ENCOUNTER — OFFICE VISIT (OUTPATIENT)
Dept: HEMATOLOGY/ONCOLOGY | Facility: CLINIC | Age: 55
End: 2025-02-24
Payer: COMMERCIAL

## 2025-02-24 VITALS
WEIGHT: 165 LBS | BODY MASS INDEX: 25.9 KG/M2 | TEMPERATURE: 99 F | HEIGHT: 67 IN | SYSTOLIC BLOOD PRESSURE: 121 MMHG | RESPIRATION RATE: 18 BRPM | DIASTOLIC BLOOD PRESSURE: 76 MMHG | HEART RATE: 79 BPM | OXYGEN SATURATION: 98 %

## 2025-02-24 DIAGNOSIS — D70.4 CYCLICAL NEUTROPENIA: Primary | ICD-10-CM

## 2025-02-24 PROCEDURE — 3074F SYST BP LT 130 MM HG: CPT | Mod: CPTII,S$GLB,, | Performed by: INTERNAL MEDICINE

## 2025-02-24 PROCEDURE — 3008F BODY MASS INDEX DOCD: CPT | Mod: CPTII,S$GLB,, | Performed by: INTERNAL MEDICINE

## 2025-02-24 PROCEDURE — 3078F DIAST BP <80 MM HG: CPT | Mod: CPTII,S$GLB,, | Performed by: INTERNAL MEDICINE

## 2025-02-24 PROCEDURE — 1160F RVW MEDS BY RX/DR IN RCRD: CPT | Mod: CPTII,S$GLB,, | Performed by: INTERNAL MEDICINE

## 2025-02-24 PROCEDURE — 1159F MED LIST DOCD IN RCRD: CPT | Mod: CPTII,S$GLB,, | Performed by: INTERNAL MEDICINE

## 2025-02-24 PROCEDURE — 99999 PR PBB SHADOW E&M-EST. PATIENT-LVL IV: CPT | Mod: PBBFAC,,, | Performed by: INTERNAL MEDICINE

## 2025-02-24 PROCEDURE — 99213 OFFICE O/P EST LOW 20 MIN: CPT | Mod: S$GLB,,, | Performed by: INTERNAL MEDICINE

## 2025-04-09 DIAGNOSIS — E11.9 TYPE 2 DIABETES MELLITUS WITHOUT COMPLICATION, WITHOUT LONG-TERM CURRENT USE OF INSULIN: ICD-10-CM

## 2025-04-09 DIAGNOSIS — E78.5 HYPERLIPIDEMIA ASSOCIATED WITH TYPE 2 DIABETES MELLITUS: ICD-10-CM

## 2025-04-09 DIAGNOSIS — E11.69 HYPERLIPIDEMIA ASSOCIATED WITH TYPE 2 DIABETES MELLITUS: ICD-10-CM

## 2025-04-09 RX ORDER — ATORVASTATIN CALCIUM 80 MG/1
80 TABLET, FILM COATED ORAL DAILY
Qty: 90 TABLET | Refills: 3 | Status: SHIPPED | OUTPATIENT
Start: 2025-04-09

## 2025-04-09 RX ORDER — METFORMIN HYDROCHLORIDE 500 MG/1
500 TABLET ORAL
Qty: 90 TABLET | Refills: 3 | Status: SHIPPED | OUTPATIENT
Start: 2025-04-09

## 2025-04-09 RX ORDER — FENOFIBRATE 160 MG/1
160 TABLET ORAL DAILY
Qty: 90 TABLET | Refills: 3 | Status: SHIPPED | OUTPATIENT
Start: 2025-04-09 | End: 2026-04-09

## 2025-04-17 RX ORDER — MELOXICAM 15 MG/1
15 TABLET ORAL DAILY PRN
COMMUNITY
Start: 2025-03-29 | End: 2025-04-17 | Stop reason: SDUPTHER

## 2025-04-17 RX ORDER — MELOXICAM 15 MG/1
15 TABLET ORAL DAILY PRN
Qty: 30 TABLET | Refills: 1 | Status: SHIPPED | OUTPATIENT
Start: 2025-04-17

## 2025-06-18 ENCOUNTER — OFFICE VISIT (OUTPATIENT)
Dept: FAMILY MEDICINE | Facility: CLINIC | Age: 55
End: 2025-06-18
Payer: MEDICARE

## 2025-06-18 VITALS
WEIGHT: 164.69 LBS | HEIGHT: 67 IN | RESPIRATION RATE: 16 BRPM | TEMPERATURE: 99 F | HEART RATE: 96 BPM | OXYGEN SATURATION: 98 % | DIASTOLIC BLOOD PRESSURE: 80 MMHG | SYSTOLIC BLOOD PRESSURE: 116 MMHG | BODY MASS INDEX: 25.85 KG/M2

## 2025-06-18 DIAGNOSIS — C44.321 SQUAMOUS CELL CANCER OF SKIN OF NOSE: ICD-10-CM

## 2025-06-18 DIAGNOSIS — E78.5 HYPERLIPIDEMIA ASSOCIATED WITH TYPE 2 DIABETES MELLITUS: ICD-10-CM

## 2025-06-18 DIAGNOSIS — E11.69 HYPERLIPIDEMIA ASSOCIATED WITH TYPE 2 DIABETES MELLITUS: ICD-10-CM

## 2025-06-18 DIAGNOSIS — E11.9 TYPE 2 DIABETES MELLITUS WITHOUT COMPLICATION, WITHOUT LONG-TERM CURRENT USE OF INSULIN: ICD-10-CM

## 2025-06-18 DIAGNOSIS — D64.9 NORMOCYTIC ANEMIA: ICD-10-CM

## 2025-06-18 DIAGNOSIS — Z71.89 ADVANCED CARE PLANNING/COUNSELING DISCUSSION: ICD-10-CM

## 2025-06-18 DIAGNOSIS — N40.0 BENIGN PROSTATIC HYPERPLASIA, UNSPECIFIED WHETHER LOWER URINARY TRACT SYMPTOMS PRESENT: ICD-10-CM

## 2025-06-18 DIAGNOSIS — Z12.5 ENCOUNTER FOR SCREENING FOR MALIGNANT NEOPLASM OF PROSTATE: ICD-10-CM

## 2025-06-18 DIAGNOSIS — Z23 NEED FOR VACCINATION: ICD-10-CM

## 2025-06-18 DIAGNOSIS — R42 DIZZINESS: ICD-10-CM

## 2025-06-18 DIAGNOSIS — N52.9 ERECTILE DYSFUNCTION, UNSPECIFIED ERECTILE DYSFUNCTION TYPE: ICD-10-CM

## 2025-06-18 DIAGNOSIS — M79.671 RIGHT FOOT PAIN: ICD-10-CM

## 2025-06-18 DIAGNOSIS — Z00.00 MEDICARE ANNUAL WELLNESS VISIT, SUBSEQUENT: Primary | ICD-10-CM

## 2025-06-18 PROCEDURE — G0439 PPPS, SUBSEQ VISIT: HCPCS | Mod: ,,,

## 2025-06-18 PROCEDURE — 1159F MED LIST DOCD IN RCRD: CPT | Mod: CPTII,,,

## 2025-06-18 PROCEDURE — 90677 PCV20 VACCINE IM: CPT | Mod: ,,,

## 2025-06-18 PROCEDURE — 3079F DIAST BP 80-89 MM HG: CPT | Mod: CPTII,,,

## 2025-06-18 PROCEDURE — 1160F RVW MEDS BY RX/DR IN RCRD: CPT | Mod: CPTII,,,

## 2025-06-18 PROCEDURE — G0009 ADMIN PNEUMOCOCCAL VACCINE: HCPCS | Mod: ,,,

## 2025-06-18 PROCEDURE — 3074F SYST BP LT 130 MM HG: CPT | Mod: CPTII,,,

## 2025-06-18 NOTE — ASSESSMENT & PLAN NOTE
Advanced care planning discussed and paperwork given.     I attest that I have had a face to face discussion with patient and or surrogate decision maker.   Included surrogate decision maker: NO  Advanced directive in chart: NO  LAPOST: NO     Total time spent: 16 minutes

## 2025-06-18 NOTE — ASSESSMENT & PLAN NOTE
Intermittent, when changing positions  He would like to establish with cardiology, referral placed  Contact clinic for concerns.   Patient agreeable to plan and verbalizes understanding.

## 2025-06-18 NOTE — ASSESSMENT & PLAN NOTE
Xray of right foot ordered, will follow up results when available. Can continue ibuprofen/tylenol PRN for pain.  Contact clinic for concerns.   Patient agreeable to plan and verbalizes understanding.

## 2025-06-18 NOTE — ASSESSMENT & PLAN NOTE
Lab Results   Component Value Date    HGBA1C 6.2 06/17/2024      Urine micro ordered  Foot exam today  Eye exam due, he will call eye doctor    On statin and metformin. Will repeat labs    Contact clinic for concerns.

## 2025-06-18 NOTE — ASSESSMENT & PLAN NOTE
Fasting labs ordered, will call with results once available  He will be due for colonoscopy in December 2025, he will call Dr. Summers's office for an appt.   He is amendable to the PCV-20 vaccine today, given in clinic.   Advanced care planning discussion and paperwork provided during this visit.

## 2025-06-18 NOTE — ASSESSMENT & PLAN NOTE
Follows with Dr. Schroeder, had removed. No issues since. Keep follow up appts with him. Advised to wear sunscreen and hats when out in the sun.
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
## 2025-06-18 NOTE — ASSESSMENT & PLAN NOTE
"On statin and fenofibrate  Recheck lipid panel with labs  Patient would like to establish with cardiology, will refer.     Lab Results   Component Value Date    CHOL 122 06/17/2024    CHOL 93 06/14/2023    CHOL 122 06/29/2022      Lab Results   Component Value Date    HDL 43 06/17/2024    HDL 37 06/14/2023    HDL 46 06/29/2022     No results found for: "LDLCALC"   Lab Results   Component Value Date    TRIG 92 06/17/2024    TRIG 69 06/14/2023    TRIG 81 06/29/2022     Lab Results   Component Value Date    TOTALCHOLEST 3 06/17/2024    TOTALCHOLEST 3 06/14/2023    TOTALCHOLEST 3 06/29/2022     No results found for: "NONHDLCHOL"  No results found for: "CHOLHDL"   "

## 2025-06-25 ENCOUNTER — HOSPITAL ENCOUNTER (OUTPATIENT)
Dept: RADIOLOGY | Facility: HOSPITAL | Age: 55
Discharge: HOME | End: 2025-06-25
Payer: MEDICARE

## 2025-06-25 DIAGNOSIS — M79.671 RIGHT FOOT PAIN: ICD-10-CM

## 2025-06-25 PROCEDURE — 73630 X-RAY EXAM OF FOOT: CPT | Mod: TC,RT

## 2025-06-26 ENCOUNTER — RESULTS FOLLOW-UP (OUTPATIENT)
Dept: FAMILY MEDICINE | Facility: CLINIC | Age: 55
End: 2025-06-26
Payer: MEDICARE

## 2025-06-26 DIAGNOSIS — M79.671 RIGHT FOOT PAIN: Primary | ICD-10-CM

## 2025-06-27 RX ORDER — KETOROLAC TROMETHAMINE 10 MG/1
10 TABLET, FILM COATED ORAL EVERY 6 HOURS
Qty: 20 TABLET | Refills: 0 | Status: SHIPPED | OUTPATIENT
Start: 2025-06-27 | End: 2025-07-02

## 2025-06-27 RX ORDER — METHYLPREDNISOLONE 4 MG/1
TABLET ORAL
Qty: 21 EACH | Refills: 0 | Status: SHIPPED | OUTPATIENT
Start: 2025-06-27

## 2025-07-02 ENCOUNTER — TELEPHONE (OUTPATIENT)
Dept: FAMILY MEDICINE | Facility: CLINIC | Age: 55
End: 2025-07-02
Payer: MEDICARE

## 2025-07-02 NOTE — TELEPHONE ENCOUNTER
Copied from CRM #[id number]. Topic: General Inquiry - Return Call  >> Jul 2, 2025  1:07 PM Erma wrote:  Who Called: Lamine Preciado    Patient is returning phone call    Who Left Message for Patient: nurse    Does the patient know what this is regarding?: how is his foot?    Preferred Method of Contact: Phone Call    Patient's Preferred Phone Number on File: [phone]     Best Call Back Number, if different: n/a    Additional Information: did not have a chance to  meds yet, so he doesn't know if they will work. He will call to let you know if the meds work.    Also, pt states he does have an appt set with a Cardiologist. With Dr Newton - August 18

## 2025-07-03 ENCOUNTER — TELEPHONE (OUTPATIENT)
Dept: FAMILY MEDICINE | Facility: CLINIC | Age: 55
End: 2025-07-03
Payer: MEDICARE

## 2025-07-03 NOTE — TELEPHONE ENCOUNTER
Copied from CRM #[id number]. Topic: General Inquiry - Patient Advice  >> Jul 3, 2025  8:44 AM Jack Wilson wrote:  Who Called: Alexey sahrma/ [facility] Chronic condition verification    Caller is requesting assistance/information from provider's office.    Symptoms (please be specific): N/A   How long has patient had these symptoms:  N/A  List of preferred pharmacies on file (remove unneeded): [unfilled]  If different, enter pharmacy into here including location and phone number: N/A      Preferred Method of Contact: Phone Call  Patient's Preferred Phone Number on File: [phone]   Best Call Back Number, if different:[phone]  ref # [id number]  Additional Information: would like a call back to verify pt dx

## 2025-07-05 ENCOUNTER — TELEPHONE (OUTPATIENT)
Dept: FAMILY MEDICINE | Facility: CLINIC | Age: 55
End: 2025-07-05
Payer: MEDICARE

## 2025-07-05 NOTE — LETTER
[facility]  [address] [place] [address], [address]  [facility] [address]  Phone: [phone] July 5, 2025     Parviz Newton MD  [address]  [facility] [address]    Patient: Lamine Preciado   MR Number: [id number]   YOB: 1970   Date of Visit: 7/5/2025       Dear Aman:    I am referring my patient, Lamine Preciado, to you for evaluation of   Hyperlipidemia associated with type 2 diabetes mellitus    Dizziness   .     I appreciate your assistance in his care and look forward to your findings and recommendations.    Sincerely,                           Gayathri Beatty MD            Please fax confirmation of appointment date and time once scheduled to [phone].  Thanks

## 2025-07-07 ENCOUNTER — TELEPHONE (OUTPATIENT)
Dept: FAMILY MEDICINE | Facility: CLINIC | Age: 55
End: 2025-07-07
Payer: MEDICARE

## 2025-07-07 DIAGNOSIS — M79.671 RIGHT FOOT PAIN: Primary | ICD-10-CM

## 2025-07-07 NOTE — TELEPHONE ENCOUNTER
Patient notified of meds sent in to tx possible gout. He picked up meds, is on day 2 states starting to help.     When would you like to do the blood work for gout? I wanted to call him back and let him know when to complete? Also do you want me to schedule a follow up appt? If so when? I cannot find the message where we discussed this in chart, I am not sure what happened I apologize

## 2025-07-07 NOTE — TELEPHONE ENCOUNTER
Copied from CRM #[id number]. Topic: General Inquiry - Return Call  >> Jul 7, 2025 10:02 AM Beny wrote:  Who Called: Lamine Preciado    Patient is returning phone call    Who Left Message for Patient:unknown  Does the patient know what this is regarding?:unknown      Preferred Method of Contact: Phone Call  Patient's Preferred Phone Number on File: [phone]   Best Call Back Number, if different:  Additional Information: Patient states he has a missed call from clinic.

## 2025-07-17 ENCOUNTER — TELEPHONE (OUTPATIENT)
Dept: FAMILY MEDICINE | Facility: CLINIC | Age: 55
End: 2025-07-17
Payer: MEDICARE

## 2025-07-17 NOTE — TELEPHONE ENCOUNTER
Copied from CRM #[id number]. Topic: General Inquiry - Patient Advice  >> Jul 17, 2025  2:19 PM Mary wrote:  .Who Called: Ramansonja Coxer    Patient is returning phone call    Who Left Message for Patient:  Does the patient know what this is regarding?:Pt would like call back in regards [facility]-pls advise       Preferred Method of Contact: Phone Call  Patient's Preferred Phone Number on File: [phone]   Best Call Back Number, if different:  Additional Information:

## 2025-07-17 NOTE — TELEPHONE ENCOUNTER
Copied from CRM #[id number]. Topic: General Inquiry - Patient Advice  >> Jul 17, 2025  1:54 PM Yun wrote:  Who Called: Lamine Preciado    Caller is requesting assistance/information from provider's office.    Symptoms (please be specific):    How long has patient had these symptoms:    List of preferred pharmacies on file (remove unneeded): [unfilled]  If different, enter pharmacy into here including location and phone number:      Preferred Method of Contact: Phone Call  Patient's Preferred Phone Number on File: [phone]   Best Call Back Number, if different:    Additional Information: pt received a letter from insurance provider requesting  document confirming he receives chronic care management  (chronic condition verification form) pt asked completed verification is faxed to [phone]